# Patient Record
Sex: FEMALE | Race: BLACK OR AFRICAN AMERICAN | Employment: FULL TIME
[De-identification: names, ages, dates, MRNs, and addresses within clinical notes are randomized per-mention and may not be internally consistent; named-entity substitution may affect disease eponyms.]

---

## 2017-09-24 ENCOUNTER — HEALTH MAINTENANCE LETTER (OUTPATIENT)
Age: 47
End: 2017-09-24

## 2017-11-17 ENCOUNTER — THERAPY VISIT (OUTPATIENT)
Dept: PHYSICAL THERAPY | Facility: CLINIC | Age: 47
End: 2017-11-17
Payer: COMMERCIAL

## 2017-11-17 DIAGNOSIS — M25.512 SHOULDER PAIN, LEFT: ICD-10-CM

## 2017-11-17 DIAGNOSIS — M54.2 CERVICALGIA: Primary | ICD-10-CM

## 2017-11-17 PROCEDURE — 97110 THERAPEUTIC EXERCISES: CPT | Mod: GP | Performed by: PHYSICAL THERAPIST

## 2017-11-17 PROCEDURE — 97161 PT EVAL LOW COMPLEX 20 MIN: CPT | Mod: GP | Performed by: PHYSICAL THERAPIST
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
## 2017-11-17 NOTE — MR AVS SNAPSHOT
"              After Visit Summary   11/17/2017    Nesha Rojas    MRN: [id number]           Patient Information     Date Of Birth          1970        Visit Information        Provider Department      11/17/2017 3:20 PM Shira Larson, PT [facility] Physical Therapy        Today's Diagnoses     Cervicalgia    -  1    Shoulder pain, left           Follow-ups after your visit        Your next 10 appointments already scheduled     Nov 21, 2017  3:20 PM CST   [facility] Spine with Shira Larson PT   [facility] Physical Therapy ([facility])    [address]  [facility] [id number] [phone]            Nov 27, 2017  4:00 PM CST   CIARAN Spine with Shira Larson PT   [facility] Physical Therapy ([facility])    [address]  [facility] [address] [phone]              Who to contact     If you have questions or need follow up information about today's clinic visit or your schedule please contact [facility] PHYSICAL THERAPY directly at [phone].  Normal or non-critical lab and imaging results will be communicated to you by [employer], letter or phone within 4 business days after the clinic has received the results. If you do not hear from us within 7 days, please contact the clinic through [employer] or phone. If you have a critical or abnormal lab result, we will notify you by phone as soon as possible.  Submit refill requests through [employer] or call your pharmacy and they will forward the refill request to us. Please allow 3 business days for your refill to be completed.          Additional Information About Your Visit        MyChart Information     [employer] lets you send messages to your doctor, view your test results, renew your prescriptions, schedule appointments and more. To sign up, go to [email or web address] . Click on \"Log in\" on the " "left side of the screen, which will take you to the Welcome page. Then click on \"Sign up Now\" on the right side of the page.     You will be asked to enter the access code listed below, as well as some personal information. Please follow the directions to create your username and password.     Your access code is: [email or web address]  Expires: 2/15/2018  4:43 PM     Your access code will  in 90 days. If you need help or a new code, please call your [facility] clinic or [phone].        Care EveryWhere ID     This is your Care EveryWhere ID. This could be used by other organizations to access your [facility] medical records  [id number]         Blood Pressure from Last 3 Encounters:   No data found for BP    Weight from Last 3 Encounters:   No data found for Wt              We Performed the Following     CIARAN Inital Eval Report     PT Eval, Low Complexity ([id number])     Therapeutic Exercises        Primary Care Provider Office Phone # Fax #    Carmichael Boris Elias [phone]       [address] [facility] [address]        Equal Access to Services     [facility]: Hadii aad ku hadasho Soedilia, waaxda luqadaha, qaybta kaalmada adeisela, melva quach . So [facility] [phone].    ATENCIÓN: Si habla español, tiene a goff disposición servicios gratuitos de asistencia lingüística. [facility] [phone].    We comply with applicable federal civil rights laws and [place] laws. We do not discriminate on the basis of race, color, national origin, age, disability, sex, sexual orientation, or gender identity.            Thank you!     Thank you for choosing [facility] PHYSICAL THERAPY  for your care. Our goal is always to provide you with excellent care. Hearing back from our patients is one way we can continue to improve our services. Please take a few minutes to complete the written survey that you may receive in the mail after your visit with us. " Thank you!             Your Updated Medication List - Protect others around you: Learn how to safely use, store and throw away your medicines at www.disposemymeds.org.      Notice  As of 11/17/2017  4:43 PM    You have not been prescribed any medications.

## 2017-12-04 ENCOUNTER — THERAPY VISIT (OUTPATIENT)
Dept: PHYSICAL THERAPY | Facility: CLINIC | Age: 47
End: 2017-12-04
Payer: COMMERCIAL

## 2017-12-04 DIAGNOSIS — M25.512 SHOULDER PAIN, LEFT: ICD-10-CM

## 2017-12-04 DIAGNOSIS — M54.2 CERVICALGIA: ICD-10-CM

## 2017-12-04 PROCEDURE — 97112 NEUROMUSCULAR REEDUCATION: CPT | Mod: GP | Performed by: PHYSICAL THERAPIST

## 2017-12-04 PROCEDURE — 97140 MANUAL THERAPY 1/> REGIONS: CPT | Mod: GP | Performed by: PHYSICAL THERAPIST

## 2017-12-04 PROCEDURE — 97110 THERAPEUTIC EXERCISES: CPT | Mod: GP | Performed by: PHYSICAL THERAPIST

## 2017-12-04 NOTE — MR AVS SNAPSHOT
"              After Visit Summary   12/4/2017    Nesha Rojas    MRN: [id number]           Patient Information     Date Of Birth          1970        Visit Information        Provider Department      12/4/2017 5:20 PM Shira Larson PT [facility]        Today's Diagnoses     Cervicalgia        Shoulder pain, left           Follow-ups after your visit        Your next 10 appointments already scheduled     Dec 18, 2017  4:00 PM CST   CIARAN Spine with Shira Larson PT   [facility] ([facility])    [address]  [facility] [id number] [phone]              Who to contact     If you have questions or need follow up information about today's clinic visit or your schedule please contact [facility] PHYSICAL [facility] directly at [phone].  Normal or non-critical lab and imaging results will be communicated to you by MyChart, letter or phone within 4 business days after the clinic has received the results. If you do not hear from us within 7 days, please contact the clinic through [employer] or phone. If you have a critical or abnormal lab result, we will notify you by phone as soon as possible.  Submit refill requests through [employer] or call your pharmacy and they will forward the refill request to us. Please allow 3 business days for your refill to be completed.          Additional Information About Your Visit        MyChart Information     [employer] lets you send messages to your doctor, view your test results, renew your prescriptions, schedule appointments and more. To sign up, go to [email or web address] . Click on \"Log in\" on the left side of the screen, which will take you to the Welcome page. Then click on \"Sign up Now\" on the right side of the page.     You will be asked to enter the access code listed below, as well as some personal information. Please " follow the directions to create your username and password.     Your access code is: [email or web address]  Expires: 2/15/2018  4:43 PM     Your access code will  in 90 days. If you need help or a new code, please call your [place] clinic or [phone].        Care EveryWhere ID     This is your Care EveryWhere ID. This could be used by other organizations to access your [place] medical records  [id number]         Blood Pressure from Last 3 Encounters:   No data found for BP    Weight from Last 3 Encounters:   No data found for Wt              We Performed the Following     Manual Ther Tech, 1+Regions, EA 15 min     Neuromuscular Re-Education     Therapeutic Exercises        Primary Care Provider Office Phone # Fax #    Carmichael Boris Elias [phone]       [address]  [facility] [address]        Equal Access to Services     [facility]: Hadii aad ku hadasho Soomaali, waaxda luqadaha, qaybta kaalmada adeegyada, waxay idiin hayaan tony kharalang quach . So [facility] [phone].    ATENCIÓN: Si habla español, tiene a goff disposición servicios gratuitos de asistencia lingüística. Llame al [phone].    We comply with applicable federal civil rights laws and [place] laws. We do not discriminate on the basis of race, color, national origin, age, disability, sex, sexual orientation, or gender identity.            Thank you!     Thank you for choosing [facility]  for your care. Our goal is always to provide you with excellent care. Hearing back from our patients is one way we can continue to improve our services. Please take a few minutes to complete the written survey that you may receive in the mail after your visit with us. Thank you!             Your Updated Medication List - Protect others around you: Learn how to safely use, store and throw away your medicines at www.disposemymeds.org.      Notice  As of 2017  5:59 PM    You have not  been prescribed any medications.

## 2017-12-04 NOTE — PROGRESS NOTES
Subjective:    HPI                    Objective:    System    Physical Exam    General     ROS    Assessment/Plan:      SUBJECTIVE  Subjective changes as noted by pt:  Less pain in the L shoulder /arm and the HEP is going well.     Current pain level: 2/10     Changes in function:  Yes (See Goal flowsheet attached for changes in current functional level)     Adverse reaction to treatment or activity:  None    OBJECTIVE  Changes in objective findings:  Yes, neutral sitting posture with verbal cues, mild end range pain shld FLX/ABD- added corner stretch, tightness L UT- added stretch, and instructed in scapular stabilization with rowing- TB. Point tenderness/ mm tension L UT/levator.        ASSESSMENT  Nesha continues to require intervention to meet STG and LTG's: PT  Patient is progressing as expected.  Patient is becoming more independent in home exercise program  Response to therapy has shown an improvement in  pain level, radicular symptoms, ROM , posture and function  Progress made towards STG/LTG?  Yes (See Goal flowsheet attached for updates on achievement of STG and LTG)    PLAN  Current treatment program is being advanced to more complex exercises.  The following procedures have been added:  stretching, neuromuscular re-education and manual therapy    PTA/ATC plan:  N/A    Please refer to the daily flowsheet for treatment today, total treatment time and time spent performing 1:1 timed codes.

## 2017-12-18 ENCOUNTER — THERAPY VISIT (OUTPATIENT)
Dept: PHYSICAL THERAPY | Facility: CLINIC | Age: 47
End: 2017-12-18
Payer: COMMERCIAL

## 2017-12-18 DIAGNOSIS — M54.2 CERVICALGIA: ICD-10-CM

## 2017-12-18 DIAGNOSIS — M25.512 SHOULDER PAIN, LEFT: ICD-10-CM

## 2017-12-18 PROCEDURE — 97112 NEUROMUSCULAR REEDUCATION: CPT | Mod: GP | Performed by: PHYSICAL THERAPIST

## 2017-12-18 PROCEDURE — 97140 MANUAL THERAPY 1/> REGIONS: CPT | Mod: GP | Performed by: PHYSICAL THERAPIST

## 2017-12-18 PROCEDURE — 97110 THERAPEUTIC EXERCISES: CPT | Mod: GP | Performed by: PHYSICAL THERAPIST

## 2017-12-18 NOTE — MR AVS SNAPSHOT
"              After Visit Summary   12/18/2017    Nesha Rojas    MRN: [id number]           Patient Information     Date Of Birth          1970        Visit Information        Provider Department      12/18/2017 4:00 PM Shira Larson PT [facility]        Today's Diagnoses     Cervicalgia        Shoulder pain, left           Follow-ups after your visit        Your next 10 appointments already scheduled     Jan 05, 2018  3:20 PM CST   CIARAN Spine with Shira Larson PT   [facility] ([facility])    [address]  [facility] [id number] [phone]              Who to contact     If you have questions or need follow up information about today's clinic visit or your schedule please contact [facility] PHYSICAL [facility] directly at [phone].  Normal or non-critical lab and imaging results will be communicated to you by MyChart, letter or phone within 4 business days after the clinic has received the results. If you do not hear from us within 7 days, please contact the clinic through [employer] or phone. If you have a critical or abnormal lab result, we will notify you by phone as soon as possible.  Submit refill requests through [employer] or call your pharmacy and they will forward the refill request to us. Please allow 3 business days for your refill to be completed.          Additional Information About Your Visit        MyChart Information     [employer] lets you send messages to your doctor, view your test results, renew your prescriptions, schedule appointments and more. To sign up, go to [email or web address] . Click on \"Log in\" on the left side of the screen, which will take you to the Welcome page. Then click on \"Sign up Now\" on the right side of the page.     You will be asked to enter the access code listed below, as well as some personal information. " Please follow the directions to create your username and password.     Your access code is: [email or web address]  Expires: 2/15/2018  4:43 PM     Your access code will  in 90 days. If you need help or a new code, please call your [place] clinic or [phone].        Care EveryWhere ID     This is your Care EveryWhere ID. This could be used by other organizations to access your [place] medical records  [id number]         Blood Pressure from Last 3 Encounters:   No data found for BP    Weight from Last 3 Encounters:   No data found for Wt              We Performed the Following     Manual Ther Tech, 1+Regions, EA 15 min     Neuromuscular Re-Education     Therapeutic Exercises        Primary Care Provider Office Phone # Fax #    Carmichael Boris Elias [phone]       [address]  [facility] [address]        Equal Access to Services     [facility]: Hadii aad ku hadasho Soomaali, waaxda luqadaha, qaybta kaalmada adeegyada, waxay idiin hayaan tony kharalang quach . So [facility] [phone].    ATENCIÓN: Si habla español, tiene a goff disposición servicios gratuitos de asistencia lingüística. Llame al [phone].    We comply with applicable federal civil rights laws and [place] laws. We do not discriminate on the basis of race, color, national origin, age, disability, sex, sexual orientation, or gender identity.            Thank you!     Thank you for choosing [facility] THER[facility]  for your care. Our goal is always to provide you with excellent care. Hearing back from our patients is one way we can continue to improve our services. Please take a few minutes to complete the written survey that you may receive in the mail after your visit with us. Thank you!             Your Updated Medication List - Protect others around you: Learn how to safely use, store and throw away your medicines at www.disposemymeds.org.      Notice  As of 2017  4:42 PM    You  have not been prescribed any medications.

## 2017-12-18 NOTE — PROGRESS NOTES
[facility] Evaluation  Subjective:    HPI                    Objective:    System    Physical Exam    General     ROS    Assessment/Plan:      SUBJECTIVE  Subjective changes as noted by pt:  My shoulder is feeling better and HEP is going well.     Current pain level: 1/10     Changes in function:  Yes (See Goal flowsheet attached for changes in current functional level)     Adverse reaction to treatment or activity:  None    OBJECTIVE  Changes in objective findings:  Yes, CROM is WFL rotation B, instructed in scaption and pulldown- tolerated well.  Muscle tension L UT/levator- reviewed neutral posture sitting at work- cervical retraction 3x daily.        ASSESSMENT  Nesha continues to require intervention to meet STG and LTG's: PT  Patient is progressing as expected.  Patient is becoming more independent in home exercise program  Response to therapy has shown an improvement in  pain level, ROM , muscle control, posture and function  Progress made towards STG/LTG?  Yes (See Goal flowsheet attached for updates on achievement of STG and LTG)    PLAN  Current treatment program is being advanced to more complex exercises.  The following procedures have been added:  strengthening and neuromuscular re-education    PTA/ATC plan:  N/A    Please refer to the daily flowsheet for treatment today, total treatment time and time spent performing 1:1 timed codes.

## 2018-01-05 ENCOUNTER — THERAPY VISIT (OUTPATIENT)
Dept: PHYSICAL THERAPY | Facility: CLINIC | Age: 48
End: 2018-01-05
Payer: COMMERCIAL

## 2018-01-05 DIAGNOSIS — M25.512 SHOULDER PAIN, LEFT: ICD-10-CM

## 2018-01-05 DIAGNOSIS — M54.2 CERVICALGIA: ICD-10-CM

## 2018-01-05 PROCEDURE — 97112 NEUROMUSCULAR REEDUCATION: CPT | Mod: GP | Performed by: PHYSICAL THERAPIST

## 2018-01-05 PROCEDURE — 97140 MANUAL THERAPY 1/> REGIONS: CPT | Mod: GP | Performed by: PHYSICAL THERAPIST

## 2018-01-05 PROCEDURE — 97110 THERAPEUTIC EXERCISES: CPT | Mod: GP | Performed by: PHYSICAL THERAPIST

## 2018-01-05 NOTE — PROGRESS NOTES
Subjective:  HPI                    Objective:  System    Physical Exam    General     ROS    Assessment/Plan:    SUBJECTIVE  Subjective changes as noted by pt:  I have not had any pain in the neck or shoulder in past week. HEP is going good.  I have been on vacation though.   Current pain level: 0/10     Changes in function:  Yes (See Goal flowsheet attached for changes in current functional level)     Adverse reaction to treatment or activity:  None    OBJECTIVE  Changes in objective findings:  Yes,  CROM is WNL pain free, L shld AROM is WNL pain free,neutral sitting posture without cues, minimal to no UT/levator mm tension or point tenderness.     ASSESSMENT  Nesha continues to require intervention to meet STG and LTG's: PT  Patient's symptoms are resolving.  Patient is becoming more independent in home exercise program  Response to therapy has shown an improvement in  pain level, ROM , muscle control, posture and function  Progress made towards STG/LTG?  Yes (See Goal flowsheet attached for updates on achievement of STG and LTG)    PLAN  Current treatment program is being advanced to more complex exercises.  The following procedures have been added:  strengthening  Patient will continue HEP and FU in PT if needed in next month.  PTA/ATC plan:  N/A    Please refer to the daily flowsheet for treatment today, total treatment time and time spent performing 1:1 timed codes.      DISCHARGE REPORT    Progress reporting period is from 11/17/17  to 1/5/18.     SUBJECTIVE                   ;   ,     The subjective and objective information are from the last SOAP note on this patient.    OBJECTIVE         ASSESSMENT/PLAN  Updated problem list and treatment plan: Diagnosis 1:  Shoulder and neck pain    STG/LTGs have been met or progress has been made towards goals:  Yes (See Goal flow sheet completed today.)  Assessment of Progress: The patient has met all of their long term goals.  Patient is meeting short term goals and  is progressing towards long term goals.  Self Management Plans:  Patient is independent in a home treatment program.  Patient is independent in self management of symptoms.    Nesha continues to require the following intervention to meet STG and LTG's: PT  We will discharge this patient from PT.    Recommendations:  This patient is ready to be discharged from therapy and continue their home treatment program.    Please refer to the daily flowsheet for treatment today, total treatment time and time spent performing 1:1 timed codes.

## 2018-01-05 NOTE — MR AVS SNAPSHOT
"              After Visit Summary   2018    Nesha Rojas    MRN: [id number]           Patient Information     Date Of Birth          1970        Visit Information        Provider Department      2018 3:20 PM Shira Larson PT [facility]        Today's Diagnoses     Cervicalgia        Shoulder pain, left           Follow-ups after your visit        Who to contact     If you have questions or need follow up information about today's clinic visit or your schedule please contact [facility] PHYSICAL [facility] directly at [phone].  Normal or non-critical lab and imaging results will be communicated to you by [email or web address], letter or phone within 4 business days after the clinic has received the results. If you do not hear from us within 7 days, please contact the clinic through [email or web address] or phone. If you have a critical or abnormal lab result, we will notify you by phone as soon as possible.  Submit refill requests through [employer] or call your pharmacy and they will forward the refill request to us. Please allow 3 business days for your refill to be completed.          Additional Information About Your Visit        MyChart Information     [employer] lets you send messages to your doctor, view your test results, renew your prescriptions, schedule appointments and more. To sign up, go to [email or web address] . Click on \"Log in\" on the left side of the screen, which will take you to the Welcome page. Then click on \"Sign up Now\" on the right side of the page.     You will be asked to enter the access code listed below, as well as some personal information. Please follow the directions to create your username and password.     Your access code is: [email or web address]  Expires: 2/15/2018  4:43 PM     Your access code will  in 90 days. If you need help or a new code, please call your [place] clinic or [phone].        Care EveryWhere " ID     This is your Care EveryWhere ID. This could be used by other organizations to access your [place] medical records  [id number]         Blood Pressure from Last 3 Encounters:   No data found for BP    Weight from Last 3 Encounters:   No data found for Wt              We Performed the Following     Manual Ther Tech, 1+Regions, EA 15 min     Neuromuscular Re-Education     Therapeutic Exercises        Primary Care Provider Office Phone # Fax #    Jany Boris Elias [phone] [address] [place] [address]        Equal Access to Services     [facility]: Hadii aad ku hadasho Soomaali, waaxda luqadaha, qaybta kaalmada adeegyada, waxay idiin hayaan adeeg kharalang lashahram . So [facility] [phone].    ATENCIÓN: Si habla español, tiene a goff disposición servicios gratuitos de asistencia lingüística. Ll[facility] [phone].    We comply with applicable federal civil rights laws and [place] laws. We do not discriminate on the basis of race, color, national origin, age, disability, sex, sexual orientation, or gender identity.            Thank you!     Thank you for choosing [facility] THERAP[facility]  for your care. Our goal is always to provide you with excellent care. Hearing back from our patients is one way we can continue to improve our services. Please take a few minutes to complete the written survey that you may receive in the mail after your visit with us. Thank you!             Your Updated Medication List - Protect others around you: Learn how to safely use, store and throw away your medicines at www.disposemymeds.org.      Notice  As of 1/5/2018  3:58 PM    You have not been prescribed any medications.

## 2018-01-24 PROBLEM — M25.512 SHOULDER PAIN, LEFT: Status: RESOLVED | Noted: 2017-11-17 | Resolved: 2018-01-24

## 2018-01-24 PROBLEM — M54.2 CERVICALGIA: Status: RESOLVED | Noted: 2017-11-17 | Resolved: 2018-01-24

## 2020-12-11 ENCOUNTER — THERAPY VISIT (OUTPATIENT)
Dept: PHYSICAL THERAPY | Facility: CLINIC | Age: 50
End: 2020-12-11
Payer: COMMERCIAL

## 2020-12-11 DIAGNOSIS — M25.512 ACUTE PAIN OF LEFT SHOULDER: ICD-10-CM

## 2020-12-11 PROCEDURE — 97161 PT EVAL LOW COMPLEX 20 MIN: CPT | Mod: GP | Performed by: PHYSICAL THERAPIST

## 2020-12-11 PROCEDURE — 97110 THERAPEUTIC EXERCISES: CPT | Mod: GP | Performed by: PHYSICAL THERAPIST

## 2020-12-11 NOTE — PROGRESS NOTES
[facility] Initial Evaluation  Subjective:  The history is provided by the patient. No  was used.   Therapist Generated HPI Evaluation  Problem details: 18 she fainted 2 x do to dehydration because she was prepping for a colonoscopy. She ended up having to get an IV and everything but since then her left shoulder she has been having a numbing pain in her upper arm. She can use it but the more she does the worse it gets. Pain does feel its in the joint. .         Type of problem:  Left shoulder.    This is a new (20) condition.  Condition occurred with:  A fall.  Where condition occurred: for unknown reasons.  Patient reports pain:  Lateral and upper arm.  Pain is described as aching and sharp and is constant.  Pain is the same all the time.  Since onset symptoms are unchanged.  Associated symptoms:  Tingling. Symptoms are exacerbated by lifting (sleeping on her left side, carrying a grocery bag. )  and relieved by nothing.      Restrictions due to condition include:  Working in normal job without restrictions.  Barriers include:  None as reported by patient.    Patient Health History  Nesha Rojas being seen for left shoulder pain .     Problem began: 2020.   Problem occurred:  a fall   Pain is reported as 6/10 on pain scale.  General health as reported by patient is good.  Pertinent medical history includes: anemia, high blood pressure, migraines/headaches and overweight.   Red flags:  None as reported by patient.  Medical allergies: none.   Surgeries include:  Other. Other surgery history details:  / hysterectomy.    Current medications:  High blood pressure medication.    Current occupation is [employer].   Primary job tasks include:  Computer work.                                    Objective:  System                   Shoulder Evaluation:  ROM:  AROM:    Flexion:  Left:  154 but with pain at 120 deg and up    Right:  154  Extension: Left:  80Right: 80  Abduction:  Left: 150   Right:  158      External Rotation:  Left:  82    Right:  82            Extension/Internal Rotation:  Left:  T7    Right:  T6    PROM:    Flexion:  Left:  150    Right: 160      Abduction:  Left:  150    Right:  160      External Rotation:  Left:  82    Right:  82                    Strength:    Flexion: Left:5/5 Strong/painful    Pain:    Right: 5/5  Strong/pain free     Pain:   Extension:  Left: 5/5  Strong/pain free    Pain:    Right: 5/5    Strong/pain free  Pain:  Abduction:  Left: 4/5  Weak/painful  Pain:    Right: 5/5   Strong/pain free    Pain:  Adduction:  Left: 5/5   Strong/pain free   Pain:    Right: 5/5   Strong/pain free    Pain:  Internal Rotation:  Left:5/5   Strong/pain free    Pain:    Right: 5/5   Strong/pain free    Pain:  External Rotation:   Left:5/5   Strong/pain free    Pain:   Right:/5  Strong/pain free    Pain:              Special Tests:    Left shoulder positive for the following special tests:  Impingement  Left shoulder negative for the following special tests:  Labral; Bursal and Rotator cuff tear    Palpation:    Left shoulder tenderness present at:  Acrimioclavicular; Levator and Rhomboids  Left shoulder tenderness not present at: Supraspinatus; Infraspinatus; Teres Minor; Subscapularis or Bicipital Groove                                       [facility] Cervical Evaluation      Movement Loss:  Protrusion (PRO): nil  Flexion (Flex): nil  Retraction (RET): nil  Extension (EXT): nil  Lateral Flexion Right (LF R): min and pain  Lateral Flexion Left (LF L): nil  Rotation Right (ROT R): nil  Rotation Left (ROT L): min and pain                                                 ROS    Assessment/Plan:    Patient is a 50 year old female with left side shoulder complaints.    Patient has the following significant findings with corresponding treatment plan.                Diagnosis 1:  Acute left shoulder pain  Pain -  hot/cold therapy, US, manual therapy, self  management, education, directional preference exercise and home program  Decreased ROM/flexibility - manual therapy, therapeutic exercise, therapeutic activity and home program  Decreased joint mobility - manual therapy, therapeutic exercise, therapeutic activity and home program  Decreased strength - therapeutic exercise, therapeutic activities and home program  Decreased function - therapeutic activities and home program    Therapy Evaluation Codes:   1) History comprised of:   Personal factors that impact the plan of care:      None.    Comorbidity factors that impact the plan of care are:      None.     Medications impacting care: None.  2) Examination of Body Systems comprised of:   Body structures and functions that impact the plan of care:      Shoulder.   Activity limitations that impact the plan of care are:      Bathing, Driving, Dressing and Lifting.  3) Clinical presentation characteristics are:   Stable/Uncomplicated.  4) Decision-Making    Low complexity using standardized patient assessment instrument and/or measureable assessment of functional outcome.  Cumulative Therapy Evaluation is: Low complexity.    Previous and current functional limitations:  (See Goal Flow Sheet for this information)    Short term and Long term goals: (See Goal Flow Sheet for this information)     Communication ability:  Patient appears to be able to clearly communicate and understand verbal and written communication and follow directions correctly.  Treatment Explanation - The following has been discussed with the patient:   RX ordered/plan of care  Anticipated outcomes  Possible risks and side effects  This patient would benefit from PT intervention to resume normal activities.   Rehab potential is good.    Frequency:  1 X week, once daily  Duration:  for 8 weeks  Discharge Plan:  Achieve all LTG.  Independent in home treatment program.  Reach maximal therapeutic benefit.    Please refer to the daily flowsheet for  treatment today, total treatment time and time spent performing 1:1 timed codes.

## 2020-12-11 NOTE — LETTER
[facility] PHYSICAL THERAPY  [address]  [facility] [address]  [phone]    2020    Re: Nesha Rojas   :   1970  MRN:  [id number]   REFERRING PHYSICIAN:   Brent Arreola    [facility] PHYSICAL THERAPY    Date of Initial Evaluation:  20  Visits:  Rxs Used: 1  Reason for Referral:  Acute pain of left shoulder    EVALUATION SUMMARY    [facility] Initial Evaluation    Subjective:  The history is provided by the patient. No  was used.     Therapist Generated HPI Evaluation  Problem details: 20 she fainted 2 x do to dehydration because she was prepping for a colonoscopy. She ended up having to get an IV and everything but since then her left shoulder she has been having a numbing pain in her upper arm. She can use it but the more she does the worse it gets. Pain does feel its in the joint. .         Type of problem:  Left shoulder.    This is a new (20) condition.  Condition occurred with:  A fall.  Where condition occurred: for unknown reasons.  Patient reports pain:  Lateral and upper arm.  Pain is described as aching and sharp and is constant.  Pain is the same all the time.  Since onset symptoms are unchanged.  Associated symptoms:  Tingling. Symptoms are exacerbated by lifting (sleeping on her left side, carrying a grocery bag. )  and relieved by nothing.    Restrictions due to condition include:  Working in normal job without restrictions.  Barriers include:  None as reported by patient.    Patient Health History  Nesha Rojas being seen for left shoulder pain .     Problem began: 2020.   Problem occurred:  a fall   Pain is reported as 6/10 on pain scale.  General health as reported by patient is good.  Pertinent medical history includes: anemia, high blood pressure, migraines/headaches and overweight.   Red flags:  None as reported by patient.  Medical allergies: none.    Surgeries include:  Other. Other surgery history details:  / hysterectomy.    Current medications:  High blood pressure medication.    Current occupation is [employer].   Primary job tasks include:  Computer work.                  Objective:  Shoulder Evaluation:  ROM:  AROM:    Flexion:  Left:  154 but with pain at 120 deg and up    Right:  154  Extension: Left: 80Right: 80    Abduction:  Left: 150   Right:  158    External Rotation:  Left:  82    Right:  82  Extension/Internal Rotation:  Left:  T7    Right:  T6      PROM:    Flexion:  Left:  150    Right: 160      Abduction:  Left:  150    Right:  160    External Rotation:  Left:  82    Right:  82    Strength:    Flexion: Left:5/5 Strong/painful    Pain:    Right: 5/5  Strong/pain free     Pain:   Extension:  Left: 5/5  Strong/pain free    Pain:    Right: 5/5    Strong/pain free  Pain:    Abduction:  Left: 4/5  Weak/painful  Pain:    Right: 5/5   Strong/pain free    Pain:  Adduction:  Left: 5/5   Strong/pain free   Pain:    Right: 5/5   Strong/pain free    Pain:    Internal Rotation:  Left:5/5   Strong/pain free    Pain:    Right: 5/5   Strong/pain free    Pain:  External Rotation:   Left:5/5   Strong/pain free    Pain:   Right:/5  Strong/pain free    Pain:    Special Tests:    Left shoulder positive for the following special tests:  Impingement  Left shoulder negative for the following special tests:  Labral; Bursal and Rotator cuff tear    Palpation:    Left shoulder tenderness present at:  Acrimioclavicular; Levator and Rhomboids  Left shoulder tenderness not present at: Supraspinatus; Infraspinatus; Teres Minor; Subscapularis or Bicipital Groove    Stuart Cervical Evaluation  Movement Loss:  Protrusion (PRO): nil  Flexion (Flex): nil  Retraction (RET): nil  Extension (EXT): nil  Lateral Flexion Right (LF R): min and pain  Lateral Flexion Left (LF L): nil  Rotation Right (ROT R): nil  Rotation Left (ROT L): min and pain        Assessment/Plan:     Patient is a 50 year old female with left side shoulder complaints.    Patient has the following significant findings with corresponding treatment plan.                Diagnosis 1:  Acute left shoulder pain  Pain -  hot/cold therapy, US, manual therapy, self management, education, directional preference exercise and home program  Decreased ROM/flexibility - manual therapy, therapeutic exercise, therapeutic activity and home program  Decreased joint mobility - manual therapy, therapeutic exercise, therapeutic activity and home program  Decreased strength - therapeutic exercise, therapeutic activities and home program  Decreased function - therapeutic activities and home program    Therapy Evaluation Codes:   1) History comprised of:   Personal factors that impact the plan of care:      None.    Comorbidity factors that impact the plan of care are:      None.     Medications impacting care: None.  2) Examination of Body Systems comprised of:   Body structures and functions that impact the plan of care:      Shoulder.   Activity limitations that impact the plan of care are:      Bathing, Driving, Dressing and Lifting.  3) Clinical presentation characteristics are:   Stable/Uncomplicated.  4) Decision-Making    Low complexity using standardized patient assessment instrument and/or measureable assessment of functional outcome.    Cumulative Therapy Evaluation is: Low complexity.    Previous and current functional limitations:  (See Goal Flow Sheet for this information)    Short term and Long term goals: (See Goal Flow Sheet for this information)     Communication ability:  Patient appears to be able to clearly communicate and understand verbal and written communication and follow directions correctly.  Treatment Explanation - The following has been discussed with the patient:   RX ordered/plan of care  Anticipated outcomes  Possible risks and side effects  This patient would benefit from PT intervention to resume  normal activities.   Rehab potential is good.    Frequency:  1 X week, once daily  Duration:  for 8 weeks  Discharge Plan:  Achieve all LTG.  Independent in home treatment program.  Reach maximal therapeutic benefit.    Thank you for your referral.              INQUIRIES  Therapist: Bin Simpson PT   [facility] PHYSICAL THERAPY  [address]  [facility] [address]  Phone: [phone]  Fax: [phone]

## 2021-02-17 PROBLEM — M25.512 ACUTE PAIN OF LEFT SHOULDER: Status: RESOLVED | Noted: 2020-12-11 | Resolved: 2021-02-17

## 2022-03-28 ENCOUNTER — PATIENT MESSAGE (OUTPATIENT)
Dept: ADMINISTRATIVE | Facility: OTHER | Age: 52
End: 2022-03-28
Payer: COMMERCIAL

## 2022-05-02 ENCOUNTER — OFFICE VISIT (OUTPATIENT)
Dept: FAMILY MEDICINE | Facility: CLINIC | Age: 52
End: 2022-05-02
Payer: COMMERCIAL

## 2022-05-02 VITALS
TEMPERATURE: 98 F | SYSTOLIC BLOOD PRESSURE: 132 MMHG | BODY MASS INDEX: 39.96 KG/M2 | DIASTOLIC BLOOD PRESSURE: 88 MMHG | OXYGEN SATURATION: 98 % | HEART RATE: 58 BPM | HEIGHT: 62 IN | WEIGHT: 217.13 LBS

## 2022-05-02 DIAGNOSIS — D64.9 ANEMIA, UNSPECIFIED TYPE: ICD-10-CM

## 2022-05-02 DIAGNOSIS — I42.2 HYPERTROPHIC CARDIOMYOPATHY: Primary | ICD-10-CM

## 2022-05-02 DIAGNOSIS — R73.9 HYPERGLYCEMIA: ICD-10-CM

## 2022-05-02 DIAGNOSIS — Z12.31 SCREENING MAMMOGRAM FOR HIGH-RISK PATIENT: ICD-10-CM

## 2022-05-02 DIAGNOSIS — Z00.00 WELLNESS EXAMINATION: ICD-10-CM

## 2022-05-02 DIAGNOSIS — Z12.11 COLON CANCER SCREENING: ICD-10-CM

## 2022-05-02 DIAGNOSIS — Z13.6 SCREENING FOR CARDIOVASCULAR CONDITION: ICD-10-CM

## 2022-05-02 PROCEDURE — 3008F PR BODY MASS INDEX (BMI) DOCUMENTED: ICD-10-PCS | Mod: CPTII,S$GLB,, | Performed by: STUDENT IN AN ORGANIZED HEALTH CARE EDUCATION/TRAINING PROGRAM

## 2022-05-02 PROCEDURE — 1160F PR REVIEW ALL MEDS BY PRESCRIBER/CLIN PHARMACIST DOCUMENTED: ICD-10-PCS | Mod: CPTII,S$GLB,, | Performed by: STUDENT IN AN ORGANIZED HEALTH CARE EDUCATION/TRAINING PROGRAM

## 2022-05-02 PROCEDURE — 3079F PR MOST RECENT DIASTOLIC BLOOD PRESSURE 80-89 MM HG: ICD-10-PCS | Mod: CPTII,S$GLB,, | Performed by: STUDENT IN AN ORGANIZED HEALTH CARE EDUCATION/TRAINING PROGRAM

## 2022-05-02 PROCEDURE — 99386 PREV VISIT NEW AGE 40-64: CPT | Mod: S$GLB,,, | Performed by: STUDENT IN AN ORGANIZED HEALTH CARE EDUCATION/TRAINING PROGRAM

## 2022-05-02 PROCEDURE — 1160F RVW MEDS BY RX/DR IN RCRD: CPT | Mod: CPTII,S$GLB,, | Performed by: STUDENT IN AN ORGANIZED HEALTH CARE EDUCATION/TRAINING PROGRAM

## 2022-05-02 PROCEDURE — 3075F SYST BP GE 130 - 139MM HG: CPT | Mod: CPTII,S$GLB,, | Performed by: STUDENT IN AN ORGANIZED HEALTH CARE EDUCATION/TRAINING PROGRAM

## 2022-05-02 PROCEDURE — 99386 PR PREVENTIVE VISIT,NEW,40-64: ICD-10-PCS | Mod: S$GLB,,, | Performed by: STUDENT IN AN ORGANIZED HEALTH CARE EDUCATION/TRAINING PROGRAM

## 2022-05-02 PROCEDURE — 3075F PR MOST RECENT SYSTOLIC BLOOD PRESS GE 130-139MM HG: ICD-10-PCS | Mod: CPTII,S$GLB,, | Performed by: STUDENT IN AN ORGANIZED HEALTH CARE EDUCATION/TRAINING PROGRAM

## 2022-05-02 PROCEDURE — 3079F DIAST BP 80-89 MM HG: CPT | Mod: CPTII,S$GLB,, | Performed by: STUDENT IN AN ORGANIZED HEALTH CARE EDUCATION/TRAINING PROGRAM

## 2022-05-02 PROCEDURE — 3008F BODY MASS INDEX DOCD: CPT | Mod: CPTII,S$GLB,, | Performed by: STUDENT IN AN ORGANIZED HEALTH CARE EDUCATION/TRAINING PROGRAM

## 2022-05-02 PROCEDURE — 1159F PR MEDICATION LIST DOCUMENTED IN MEDICAL RECORD: ICD-10-PCS | Mod: CPTII,S$GLB,, | Performed by: STUDENT IN AN ORGANIZED HEALTH CARE EDUCATION/TRAINING PROGRAM

## 2022-05-02 PROCEDURE — 1159F MED LIST DOCD IN RCRD: CPT | Mod: CPTII,S$GLB,, | Performed by: STUDENT IN AN ORGANIZED HEALTH CARE EDUCATION/TRAINING PROGRAM

## 2022-05-02 RX ORDER — METOPROLOL SUCCINATE 50 MG/1
50 TABLET, EXTENDED RELEASE ORAL ONCE
COMMUNITY
Start: 2021-09-09 | End: 2022-11-01 | Stop reason: SDUPTHER

## 2022-05-02 RX ORDER — CYANOCOBALAMIN 1000 UG/ML
1000 INJECTION, SOLUTION INTRAMUSCULAR; SUBCUTANEOUS
COMMUNITY
End: 2024-02-07

## 2022-05-02 NOTE — PROGRESS NOTES
Patient ID: Antonieta Enriquez is a 52 y.o. female. This patient is new to me.    Chief Complaint: Establish Care    HPI  Patient here to establish care. She moved to the area approximately 4 months ago due to her job. She was previously following with a PCP in [place]. She has a history of hypertrophic cardiomyopathy. She has not established care with a local cardiololgist at this time.     She does not colon cancer screening. She has tried having a colonoscopy in the past but passed out twice due to dehydration while doing the prep.    She denies any recent chest pain or shortness of breath.     Past Medical History:   Diagnosis Date    Hypertension     Hypertrophic cardiomyopathy     Migraine        Past Surgical History:   Procedure Laterality Date     SECTION      CHOLECYSTECTOMY      GASTRIC BYPASS N/A 2009    HYSTERECTOMY         Family History   Problem Relation Age of Onset    Arthritis Mother     Diabetes Mother     Hypertension Mother     Cancer Father     Diabetes Brother     Diabetes Brother        Social History     Tobacco Use    Smoking status: Never Smoker    Smokeless tobacco: Never Used   Substance Use Topics    Alcohol use: Yes    Drug use: Never       Review of patient's allergies indicates:  No Known Allergies     Review of Systems  Review of Systems   Constitutional: Negative for fever.   HENT: Negative for ear pain, hearing loss and sinus pain.    Eyes: Negative for discharge.   Respiratory: Negative for cough, shortness of breath and wheezing.    Cardiovascular: Negative for chest pain, palpitations and leg swelling.   Gastrointestinal: Negative for blood in stool, constipation, diarrhea, nausea and vomiting.   Genitourinary: Negative for dysuria, hematuria and urgency.   Musculoskeletal: Negative for myalgias and neck pain.   Skin: Negative for rash.   Neurological: Negative for weakness and headaches.   Endo/Heme/Allergies: Negative for polydipsia.  "  Psychiatric/Behavioral: Negative for depression.   All other systems reviewed and are negative.      Currently Medications  Current Outpatient Medications on File Prior to Visit   Medication Sig Dispense Refill    cyanocobalamin 1,000 mcg/mL injection Inject 1,000 mcg into the muscle.      metoprolol succinate (TOPROL-XL) 50 MG 24 hr tablet Take 50 mg by mouth once.      [DISCONTINUED] lisinopril-hydrochlorothiazide (PRINZIDE,ZESTORETIC) 10-12.5 mg per tablet Take 1 tablet by mouth once daily.       No current facility-administered medications on file prior to visit.       Physical  Exam  Vitals:    05/02/22 1543   BP: 132/88   BP Location: Right arm   Patient Position: Sitting   Pulse: (!) 58   Temp: 98.3 °F (36.8 °C)   TempSrc: Oral   SpO2: 98%   Weight: 98.5 kg (217 lb 2.5 oz)   Height: 5' 2" (1.575 m)      Body mass index is 39.72 kg/m².    Physical Exam  Vitals and nursing note reviewed.   Constitutional:       General: She is not in acute distress.     Appearance: She is not ill-appearing.   HENT:      Head: Normocephalic and atraumatic.      Right Ear: External ear normal.      Left Ear: External ear normal.      Nose: Nose normal.      Mouth/Throat:      Mouth: Mucous membranes are moist.   Eyes:      Extraocular Movements: Extraocular movements intact.      Conjunctiva/sclera: Conjunctivae normal.   Cardiovascular:      Rate and Rhythm: Normal rate and regular rhythm.      Pulses: Normal pulses.      Heart sounds: Murmur heard.    Systolic murmur is present.  Pulmonary:      Effort: Pulmonary effort is normal. No respiratory distress.      Breath sounds: No wheezing.   Abdominal:      General: There is no distension.      Palpations: Abdomen is soft. There is no mass.      Tenderness: There is no abdominal tenderness.   Musculoskeletal:         General: No swelling.      Cervical back: Normal range of motion.   Skin:     Coloration: Skin is not jaundiced.      Findings: No rash.   Neurological:      " General: No focal deficit present.      Mental Status: She is alert and oriented to person, place, and time.   Psychiatric:         Mood and Affect: Mood normal.         Thought Content: Thought content normal.         Labs:    Complete Blood Count  Lab Results   Component Value Date    RBC 4.60 02/13/2016    HGB 11.4 (L) 02/13/2016    HCT 35.6 (L) 02/13/2016    MCV 77 (L) 02/13/2016    MCH 24.8 (L) 02/13/2016    MCHC 32.0 02/13/2016    RDW 15.2 (H) 02/13/2016     02/13/2016    MPV 10.3 02/13/2016    GRAN 2.2 02/13/2016    GRAN 39.0 02/13/2016    LYMPH 2.8 02/13/2016    LYMPH 50.1 (H) 02/13/2016    MONO 0.5 02/13/2016    MONO 9.7 02/13/2016    EOS 0.0 02/13/2016    BASO 0.03 02/13/2016    EOSINOPHIL 0.7 02/13/2016    BASOPHIL 0.5 02/13/2016    DIFFMETHOD Automated 02/13/2016       Comprehensive Metabolic Panel  No results found for: GLU, BUN, CREATININE, NA, K, CL, PROT, ALBUMIN, BILITOT, AST, ALKPHOS, CO2, ALT, ANIONGAP, EGFRNONAA, ESTGFRAFRICA    TSH  No results found for: TSH    Imaging:  X-Ray Chest PA And Lateral  Narrative: PA and Lateral Chest x-ray    Clinical Indication: Chest pain.  Essential hypertension.       Findings:    No comparison studies are available.      The lungs are clear. The cardiac silhouette size is normal. The trachea is midline and the mediastinal width is normal. Negative for focal infiltrate, effusion or pneumothorax. Pulmonary vasculature is normal. Negative for osseous abnormalities. There are degenerative changes of the spine. There is tortuosity of the descending thoracic aorta. There are cholecystectomy clips.  Impression:        1.  Negative for acute process involving the chest.  2.  Incidental findings as noted above.    Electronically signed by: SANTIAGO FARRELL MD  Date:     02/13/16  Time:    14:13   CT Head Without Contrast  Narrative: Head CT scan without contrast    Clinical Indication: Unspecified speech disturbances.    Findings:  No comparison studies are  available.   The ventricles are midline and the CSF spaces are normal. The gray-white matter junction is well preserved. Negative for intracranial vascular abnormalities. Negative for mass, mass effect, cerebral edema, hemorrhage or abnormal fluid collections.       The skull and scalp are intact.    The   paranasal sinuses, mastoid air cells, middle ears and ear canals are clear. The globes are intact.  Impression:        1.  Negative for acute intracranial process. Negative for hemorrhage, or skull fracture.    Electronically signed by: SANTIAGO FARRELL MD  Date:     02/13/16  Time:    14:06       Assessment/Plan:    Problem List Items Addressed This Visit    None     Visit Diagnoses     Hypertrophic cardiomyopathy    -  Primary    Relevant Orders    Ambulatory referral/consult to Cardiology    TSH    Screening mammogram for high-risk patient        Relevant Orders    Mammo Digital Screening Bilat w/ Vic    Colon cancer screening        Relevant Orders    Cologuard Screening (Multitarget Stool DNA)    Anemia, unspecified type        Relevant Orders    CBC Auto Differential    Comprehensive metabolic panel    Hyperglycemia        Relevant Orders    HEMOGLOBIN A1C    Screening for cardiovascular condition        Relevant Orders    LIPID PANEL            Discussed how to stay healthy including: diet, exercise, refraining from smoking and discussed screening exams / tests needed for age, sex and family Hx.    RTC pending results of blood test    Rebel Kimball MD

## 2022-05-23 ENCOUNTER — OFFICE VISIT (OUTPATIENT)
Dept: CARDIOLOGY | Facility: CLINIC | Age: 52
End: 2022-05-23
Payer: COMMERCIAL

## 2022-05-23 ENCOUNTER — PATIENT MESSAGE (OUTPATIENT)
Dept: ADMINISTRATIVE | Facility: HOSPITAL | Age: 52
End: 2022-05-23
Payer: COMMERCIAL

## 2022-05-23 VITALS
BODY MASS INDEX: 38.68 KG/M2 | WEIGHT: 211.5 LBS | HEART RATE: 47 BPM | DIASTOLIC BLOOD PRESSURE: 82 MMHG | OXYGEN SATURATION: 98 % | SYSTOLIC BLOOD PRESSURE: 146 MMHG

## 2022-05-23 DIAGNOSIS — I34.0 NONRHEUMATIC MITRAL VALVE REGURGITATION: ICD-10-CM

## 2022-05-23 DIAGNOSIS — E66.9 OBESITY (BMI 35.0-39.9 WITHOUT COMORBIDITY): ICD-10-CM

## 2022-05-23 DIAGNOSIS — I10 PRIMARY HYPERTENSION: Primary | ICD-10-CM

## 2022-05-23 DIAGNOSIS — R00.1 BRADYCARDIA: ICD-10-CM

## 2022-05-23 DIAGNOSIS — I42.1 HOCM (HYPERTROPHIC OBSTRUCTIVE CARDIOMYOPATHY): ICD-10-CM

## 2022-05-23 DIAGNOSIS — I42.2 HYPERTROPHIC CARDIOMYOPATHY: ICD-10-CM

## 2022-05-23 PROCEDURE — 3079F DIAST BP 80-89 MM HG: CPT | Mod: CPTII,S$GLB,, | Performed by: INTERNAL MEDICINE

## 2022-05-23 PROCEDURE — 3077F PR MOST RECENT SYSTOLIC BLOOD PRESSURE >= 140 MM HG: ICD-10-PCS | Mod: CPTII,S$GLB,, | Performed by: INTERNAL MEDICINE

## 2022-05-23 PROCEDURE — 3079F PR MOST RECENT DIASTOLIC BLOOD PRESSURE 80-89 MM HG: ICD-10-PCS | Mod: CPTII,S$GLB,, | Performed by: INTERNAL MEDICINE

## 2022-05-23 PROCEDURE — 99999 PR PBB SHADOW E&M-EST. PATIENT-LVL III: CPT | Mod: PBBFAC,,, | Performed by: INTERNAL MEDICINE

## 2022-05-23 PROCEDURE — 99205 PR OFFICE/OUTPT VISIT, NEW, LEVL V, 60-74 MIN: ICD-10-PCS | Mod: S$GLB,,, | Performed by: INTERNAL MEDICINE

## 2022-05-23 PROCEDURE — 4010F PR ACE/ARB THEARPY RXD/TAKEN: ICD-10-PCS | Mod: CPTII,S$GLB,, | Performed by: INTERNAL MEDICINE

## 2022-05-23 PROCEDURE — 4010F ACE/ARB THERAPY RXD/TAKEN: CPT | Mod: CPTII,S$GLB,, | Performed by: INTERNAL MEDICINE

## 2022-05-23 PROCEDURE — 3008F BODY MASS INDEX DOCD: CPT | Mod: CPTII,S$GLB,, | Performed by: INTERNAL MEDICINE

## 2022-05-23 PROCEDURE — 99999 PR PBB SHADOW E&M-EST. PATIENT-LVL III: ICD-10-PCS | Mod: PBBFAC,,, | Performed by: INTERNAL MEDICINE

## 2022-05-23 PROCEDURE — 3077F SYST BP >= 140 MM HG: CPT | Mod: CPTII,S$GLB,, | Performed by: INTERNAL MEDICINE

## 2022-05-23 PROCEDURE — 3008F PR BODY MASS INDEX (BMI) DOCUMENTED: ICD-10-PCS | Mod: CPTII,S$GLB,, | Performed by: INTERNAL MEDICINE

## 2022-05-23 PROCEDURE — 99205 OFFICE O/P NEW HI 60 MIN: CPT | Mod: S$GLB,,, | Performed by: INTERNAL MEDICINE

## 2022-05-23 RX ORDER — LISINOPRIL 5 MG/1
5 TABLET ORAL DAILY
Qty: 90 TABLET | Refills: 4 | Status: SHIPPED | OUTPATIENT
Start: 2022-05-23 | End: 2023-05-28

## 2022-05-23 NOTE — PROGRESS NOTES
[facility] Cardiology     Subjective:    Patient ID:  Antonieta Enriquez is a 52 y.o. female who presents for evaluation of Hypertension and Hypertrophic Cardiomyopathy    Review of patient's allergies indicates:  No Known Allergies   She has a diagnosis of HOCM.  Her last echo was 2019. She had a cardiac MR study 2021. She has been on hypertension therapy since 2016. A negative stress test was done in 2017. She was subsequently diagnosed with HOCM based on echocardiography.  She has an STEPHANIE and ROSEMARY.  Her gadolinium CMR was normal.  Her inferoseptal thickness was 2.7 cm.  Her ejection fraction by echo was 80%.  She has a 70 mm resting gradient with 120 gradient with Valsalva.  She has recognized to have 2+ MR. She exercises well.  She can climb 2 flights of stairs.    She has never been advised to have a defibrillator.  She has had syncope but only during a colonoscopy prep and a 2nd episode possibly related to bradycardia.  She is on low-dose metoprolol and has heart rates in the 40s.  She no longer has lightheadedness.  She has not had syncope.  She has a normal lipid profile.  She used to take lisinopril HCTZ 10/12.5 mg. She did not tolerate amlodipine due to fatigue symptoms.  Her blood pressure today is 146/82.  She has had palpitations in the past but none currently.  Most of her family members opted not to be tested for HOCM.    She works full-time.  She has been advised to lose weight.  She just got some of her materials from [place] after her move.  Included is for blood pressure monitoring system that she got this month.  She sees blood pressure is 130-140 is commonly.        Review of Systems   Constitutional: Positive for weight gain. Negative for chills, decreased appetite, diaphoresis, fever, malaise/fatigue, night sweats and weight loss.   HENT: Negative for congestion, ear discharge, ear pain, hearing loss, hoarse voice,  nosebleeds, odynophagia, sore throat, stridor and tinnitus.    Eyes: Negative for blurred vision, discharge, double vision, pain, photophobia, redness, vision loss in left eye, vision loss in right eye, visual disturbance and visual halos.   Cardiovascular: Positive for palpitations. Negative for chest pain, claudication, cyanosis, dyspnea on exertion, irregular heartbeat, leg swelling, near-syncope, orthopnea, paroxysmal nocturnal dyspnea and syncope.   Respiratory: Negative for cough, hemoptysis, shortness of breath, sleep disturbances due to breathing, snoring, sputum production and wheezing.    Endocrine: Negative for cold intolerance, heat intolerance, polydipsia, polyphagia and polyuria.   Hematologic/Lymphatic: Negative for adenopathy and bleeding problem. Does not bruise/bleed easily.   Skin: Negative for color change, dry skin, flushing, itching, nail changes, poor wound healing, rash, skin cancer, suspicious lesions and unusual hair distribution.   Musculoskeletal: Negative for arthritis, back pain, falls, gout, joint pain, joint swelling, muscle cramps, muscle weakness, myalgias, neck pain and stiffness.   Gastrointestinal: Negative for bloating, abdominal pain, anorexia, change in bowel habit, bowel incontinence, constipation, diarrhea, dysphagia, excessive appetite, flatus, heartburn, hematemesis, hematochezia, hemorrhoids, jaundice, melena, nausea and vomiting.   Genitourinary: Negative for bladder incontinence, decreased libido, dysuria, flank pain, frequency, genital sores, hematuria, hesitancy, incomplete emptying, nocturia and urgency.   Neurological: Negative for aphonia, brief paralysis, difficulty with concentration, disturbances in coordination, excessive daytime sleepiness, dizziness, focal weakness, headaches, light-headedness, loss of balance, numbness, paresthesias, seizures, sensory change, tremors, vertigo and weakness.   Psychiatric/Behavioral: Negative for altered mental status,  depression, hallucinations, memory loss, substance abuse, suicidal ideas and thoughts of violence. The patient does not have insomnia and is not nervous/anxious.    Allergic/Immunologic: Negative for hives and persistent infections.        Objective:       Vitals:    05/23/22 0813   BP: (!) 146/82   Pulse: (!) 47   SpO2: 98%   Weight: 95.9 kg (211 lb 8 oz)    Physical Exam  Constitutional:       General: She is not in acute distress.     Appearance: She is well-developed. She is not diaphoretic.   HENT:      Head: Normocephalic and atraumatic.      Nose: Nose normal.   Eyes:      General: No scleral icterus.        Right eye: No discharge.      Conjunctiva/sclera: Conjunctivae normal.      Pupils: Pupils are equal, round, and reactive to light.   Neck:      Thyroid: No thyromegaly.      Vascular: No JVD.      Trachea: No tracheal deviation.   Cardiovascular:      Rate and Rhythm: Regular rhythm. Bradycardia present.      Pulses:           Carotid pulses are 2+ on the right side and 2+ on the left side.       Radial pulses are 2+ on the right side and 2+ on the left side.        Dorsalis pedis pulses are 2+ on the right side and 2+ on the left side.        Posterior tibial pulses are 2+ on the right side and 2+ on the left side.      Heart sounds: Murmur heard.    Low-pitched harsh mid to late systolic murmur is present with a grade of 3/6 at the upper right sternal border and upper left sternal border.    No friction rub. No gallop.   Pulmonary:      Effort: Pulmonary effort is normal. No respiratory distress.      Breath sounds: Normal breath sounds. No stridor. No wheezing or rales.   Chest:      Chest wall: No tenderness.   Abdominal:      General: Bowel sounds are normal. There is no distension.      Palpations: Abdomen is soft. There is no mass.      Tenderness: There is no abdominal tenderness. There is no guarding or rebound.   Musculoskeletal:         General: No tenderness. Normal range of motion.       Cervical back: Normal range of motion and neck supple.   Lymphadenopathy:      Cervical: No cervical adenopathy.   Skin:     General: Skin is warm and dry.      Coloration: Skin is not pale.      Findings: No erythema or rash.   Neurological:      Mental Status: She is alert and oriented to person, place, and time.      Cranial Nerves: No cranial nerve deficit.      Coordination: Coordination normal.   Psychiatric:         Behavior: Behavior normal.         Thought Content: Thought content normal.         Judgment: Judgment normal.           Assessment:       1. Primary hypertension    2. Hypertrophic cardiomyopathy    3. HOCM (hypertrophic obstructive cardiomyopathy)    4. Obesity (BMI 35.0-39.9 without comorbidity)    5. Bradycardia    6. Nonrheumatic mitral valve regurgitation      Results for orders placed or performed during the hospital encounter of 02/13/16   Rapid Complete Blood Count (CBC)   Result Value Ref Range    WBC 5.57 3.90 - 12.70 K/uL    RBC 4.60 4.00 - 5.40 M/uL    Hemoglobin 11.4 (L) 12.0 - 16.0 g/dL    Hematocrit 35.6 (L) 37.0 - 48.5 %    MCV 77 (L) 82 - 98 fL    MCH 24.8 (L) 27.0 - 31.0 pg    MCHC 32.0 32.0 - 36.0 %    RDW 15.2 (H) 11.5 - 14.5 %    Platelets 177 150 - 350 K/uL    MPV 10.3 9.2 - 12.9 fL    Gran # (ANC) 2.2 1.8 - 7.7 K/uL    Lymph # 2.8 1.0 - 4.8 K/uL    Mono # 0.5 0.3 - 1.0 K/uL    Eos # 0.0 0.0 - 0.5 K/uL    Baso # 0.03 0.00 - 0.20 K/uL    Gran % 39.0 38.0 - 73.0 %    Lymph % 50.1 (H) 18.0 - 48.0 %    Mono % 9.7 4.0 - 15.0 %    Eosinophil % 0.7 0.0 - 8.0 %    Basophil % 0.5 0.0 - 1.9 %    Differential Method Automated    Rapid Basic Metabolic Panel (BMP)   Result Value Ref Range    Sodium 141 136 - 145 mmol/L    Potassium 3.9 3.5 - 5.1 mmol/L    Chloride 105 95 - 110 mmol/L    CO2 25 23 - 29 mmol/L    Glucose 88 70 - 110 mg/dL    BUN 15 6 - 20 mg/dL    Creatinine 0.9 0.5 - 1.4 mg/dL    Calcium 9.3 8.7 - 10.5 mg/dL    Anion Gap 11 8 - 16 mmol/L    eGFR if African American >60.0  >60 mL/min/1.73 m^2    eGFR if non African American >60.0 >60 mL/min/1.73 m^2   Drug screen panel, emergency   Result Value Ref Range    Benzodiazepines Negative     Methadone metabolites Negative     Cocaine (Metab.) Negative     Opiate Scrn, Ur Negative     Barbiturate Screen, Ur Negative     Amphetamine Screen, Ur Negative     THC Negative     Phencyclidine Negative     Creatinine, Urine 91.1 15.0 - 325.0 mg/dL    Toxicology Information SEE COMMENT    Protime-INR   Result Value Ref Range    Prothrombin Time 10.2 9.0 - 12.5 sec    INR 1.0 0.8 - 1.2   APTT   Result Value Ref Range    aPTT 26.1 21.0 - 32.0 sec   POCT glucose   Result Value Ref Range    POCT Glucose 88 70 - 110 mg/dL         Current Outpatient Medications:     cyanocobalamin 1,000 mcg/mL injection, Inject 1,000 mcg into the muscle., Disp: , Rfl:     lisinopriL (PRINIVIL,ZESTRIL) 5 MG tablet, Take 1 tablet (5 mg total) by mouth once daily., Disp: 90 tablet, Rfl: 4    metoprolol succinate (TOPROL-XL) 50 MG 24 hr tablet, Take 50 mg by mouth once., Disp: , Rfl:      Lab Results   Component Value Date    WBC 5.57 02/13/2016    RBC 4.60 02/13/2016    HGB 11.4 (L) 02/13/2016    HCT 35.6 (L) 02/13/2016    MCV 77 (L) 02/13/2016    MCH 24.8 (L) 02/13/2016    MCHC 32.0 02/13/2016    RDW 15.2 (H) 02/13/2016     02/13/2016    MPV 10.3 02/13/2016    GRAN 2.2 02/13/2016    GRAN 39.0 02/13/2016    LYMPH 2.8 02/13/2016    LYMPH 50.1 (H) 02/13/2016    MONO 0.5 02/13/2016    MONO 9.7 02/13/2016    EOS 0.0 02/13/2016    BASO 0.03 02/13/2016    EOSINOPHIL 0.7 02/13/2016    BASOPHIL 0.5 02/13/2016        CMP  Lab Results   Component Value Date     02/13/2016    K 3.9 02/13/2016     02/13/2016    CO2 25 02/13/2016    GLU 88 02/13/2016    BUN 15 02/13/2016    CREATININE 0.9 02/13/2016    CALCIUM 9.3 02/13/2016    ANIONGAP 11 02/13/2016    ESTGFRAFRICA >60.0 02/13/2016    EGFRNONAA >60.0 02/13/2016        No results found for: LABBLOO, LABURIN,  RESPIRATORYC, GSRESP         Results for orders placed or performed during the hospital encounter of 02/13/16   EKG 12-LEAD    Collection Time: 02/13/16  1:39 PM    Narrative    Test Reason : RICK  Blood Pressure : 130/70 mmHG  Vent. Rate : 049 BPM     Atrial Rate : 049 BPM     P-R Int : 160 ms          QRS Dur : 096 ms      QT Int : 462 ms       P-R-T Axes : 004 009 022 degrees     QTc Int : 417 ms    Sinus bradycardia  Voltage criteria for left ventricular hypertrophy  ST elevation, consider early repolarization  Abnormal ECG  No previous ECGs available  Confirmed by KAVITA SCHRADER MD ([id number]) on 2/14/2016 6:08:01 PM    Referred By: SELF REFERRAL           Confirmed By:KAVITA SCHRADER MD        X-Ray Chest PA And Lateral 02/13/2016 [id number] Final   CT Head Without Contrast 02/13/2016 [id number] Final            Plan:       Problem List Items Addressed This Visit        Cardiac/Vascular    HOCM (hypertrophic obstructive cardiomyopathy)     Records reviewed.  She has a heart murmur consistent with the diagnosis.  She functions well.  Metoprolol will be continued.  By echo review, 74 mm resting gradient with 120 mm gradient with Valsalva.    Obviously if she was not bradycardic metoprolol dosing would be increased.  Despite an impressive gradient she functions well.  Condition unchanged.           Hypertension - Primary     Lisinopril 5 mg added to metoprolol for better blood pressure control.  She has tolerated lisinopril in the past.           Relevant Medications    lisinopriL (PRINIVIL,ZESTRIL) 5 MG tablet    Bradycardia     Related to metoprolol usage.  She is asymptomatic.  She has had symptoms related to bradycardia with near syncope in the past.  Current dose to continue of metoprolol.           Nonrheumatic mitral valve regurgitation     Reported as moderate with underlying systolic motion of the anterior leaflet.  Not on unusual finding with hypertrophic cardiomyopathy patients.              Endocrine    Obesity (BMI  35.0-39.9 without comorbidity)     Continued weight loss encouraged.             Other Visit Diagnoses     Hypertrophic cardiomyopathy        Relevant Medications    lisinopriL (PRINIVIL,ZESTRIL) 5 MG tablet    Other Relevant Orders    EKG 12-lead    Echo             I will see her back in 6 months with an echo ordered prior to that visit.  She is functioning well.  Lisinopril added for better blood pressure control.      Thank you for allowing me to participate in your patient's care.        Govind Bourgeois MD  05/23/2022   8:47 AM

## 2022-05-23 NOTE — ASSESSMENT & PLAN NOTE
Reported as moderate with underlying systolic motion of the anterior leaflet.  Not on unusual finding with hypertrophic cardiomyopathy patients.

## 2022-05-23 NOTE — ASSESSMENT & PLAN NOTE
Related to metoprolol usage.  She is asymptomatic.  She has had symptoms related to bradycardia with near syncope in the past.  Current dose to continue of metoprolol.

## 2022-05-23 NOTE — ASSESSMENT & PLAN NOTE
Lisinopril 5 mg added to metoprolol for better blood pressure control.  She has tolerated lisinopril in the past.

## 2022-05-23 NOTE — ASSESSMENT & PLAN NOTE
Records reviewed.  She has a heart murmur consistent with the diagnosis.  She functions well.  Metoprolol will be continued.  By echo review, 74 mm resting gradient with 120 mm gradient with Valsalva.    Obviously if she was not bradycardic metoprolol dosing would be increased.  Despite an impressive gradient she functions well.  Condition unchanged.

## 2022-05-26 LAB — NONINV COLON CA DNA+OCC BLD SCRN STL QL: NEGATIVE

## 2022-05-30 ENCOUNTER — LAB VISIT (OUTPATIENT)
Dept: LAB | Facility: HOSPITAL | Age: 52
End: 2022-05-30
Attending: STUDENT IN AN ORGANIZED HEALTH CARE EDUCATION/TRAINING PROGRAM
Payer: COMMERCIAL

## 2022-05-30 DIAGNOSIS — D64.9 ANEMIA, UNSPECIFIED TYPE: ICD-10-CM

## 2022-05-30 DIAGNOSIS — Z13.6 SCREENING FOR CARDIOVASCULAR CONDITION: ICD-10-CM

## 2022-05-30 DIAGNOSIS — I42.2 HYPERTROPHIC CARDIOMYOPATHY: ICD-10-CM

## 2022-05-30 DIAGNOSIS — R73.9 HYPERGLYCEMIA: ICD-10-CM

## 2022-05-30 LAB
ALBUMIN SERPL BCP-MCNC: 3.8 G/DL (ref 3.5–5.2)
ALP SERPL-CCNC: 102 U/L (ref 38–126)
ALT SERPL W/O P-5'-P-CCNC: 20 U/L (ref 10–44)
ANION GAP SERPL CALC-SCNC: 7 MMOL/L (ref 8–16)
AST SERPL-CCNC: 24 U/L (ref 15–46)
BASOPHILS # BLD AUTO: 0.02 K/UL (ref 0–0.2)
BASOPHILS NFR BLD: 0.4 % (ref 0–1.9)
BILIRUB SERPL-MCNC: 0.6 MG/DL (ref 0.1–1)
CALCIUM SERPL-MCNC: 9.1 MG/DL (ref 8.7–10.5)
CHLORIDE SERPL-SCNC: 108 MMOL/L (ref 95–110)
CHOLEST SERPL-MCNC: 145 MG/DL (ref 120–199)
CHOLEST/HDLC SERPL: 2.7 {RATIO} (ref 2–5)
CO2 SERPL-SCNC: 28 MMOL/L (ref 23–29)
CREAT SERPL-MCNC: 1 MG/DL (ref 0.5–1.4)
DIFFERENTIAL METHOD: ABNORMAL
EOSINOPHIL # BLD AUTO: 0.1 K/UL (ref 0–0.5)
EOSINOPHIL NFR BLD: 1.5 % (ref 0–8)
ERYTHROCYTE [DISTWIDTH] IN BLOOD BY AUTOMATED COUNT: 15 % (ref 11.5–14.5)
EST. GFR  (AFRICAN AMERICAN): >60 ML/MIN/1.73 M^2
EST. GFR  (NON AFRICAN AMERICAN): >60 ML/MIN/1.73 M^2
ESTIMATED AVG GLUCOSE: 128 MG/DL (ref 68–131)
GLUCOSE SERPL-MCNC: 83 MG/DL (ref 70–110)
HBA1C MFR BLD: 6.1 % (ref 4–5.6)
HCT VFR BLD AUTO: 38.4 % (ref 37–48.5)
HDLC SERPL-MCNC: 53 MG/DL (ref 40–75)
HDLC SERPL: 36.6 % (ref 20–50)
HGB BLD-MCNC: 11.9 G/DL (ref 12–16)
IMM GRANULOCYTES # BLD AUTO: 0 K/UL (ref 0–0.04)
IMM GRANULOCYTES NFR BLD AUTO: 0 % (ref 0–0.5)
LDLC SERPL CALC-MCNC: 85.8 MG/DL (ref 63–159)
LYMPHOCYTES # BLD AUTO: 2 K/UL (ref 1–4.8)
LYMPHOCYTES NFR BLD: 42.6 % (ref 18–48)
MCH RBC QN AUTO: 25.7 PG (ref 27–31)
MCHC RBC AUTO-ENTMCNC: 31 G/DL (ref 32–36)
MCV RBC AUTO: 83 FL (ref 82–98)
MONOCYTES # BLD AUTO: 0.4 K/UL (ref 0.3–1)
MONOCYTES NFR BLD: 8 % (ref 4–15)
NEUTROPHILS # BLD AUTO: 2.2 K/UL (ref 1.8–7.7)
NEUTROPHILS NFR BLD: 47.5 % (ref 38–73)
NONHDLC SERPL-MCNC: 92 MG/DL
NRBC BLD-RTO: 0 /100 WBC
PLATELET # BLD AUTO: 198 K/UL (ref 150–450)
PMV BLD AUTO: 11 FL (ref 9.2–12.9)
POTASSIUM SERPL-SCNC: 4.7 MMOL/L (ref 3.5–5.1)
PROT SERPL-MCNC: 6.6 G/DL (ref 6–8.4)
RBC # BLD AUTO: 4.63 M/UL (ref 4–5.4)
SODIUM SERPL-SCNC: 143 MMOL/L (ref 136–145)
TRIGL SERPL-MCNC: 31 MG/DL (ref 30–150)
TSH SERPL DL<=0.005 MIU/L-ACNC: 2.18 UIU/ML (ref 0.4–4)
UUN UR-MCNC: 15 MG/DL (ref 7–17)
WBC # BLD AUTO: 4.62 K/UL (ref 3.9–12.7)

## 2022-05-30 PROCEDURE — 85025 COMPLETE CBC W/AUTO DIFF WBC: CPT | Mod: PO | Performed by: STUDENT IN AN ORGANIZED HEALTH CARE EDUCATION/TRAINING PROGRAM

## 2022-05-30 PROCEDURE — 83036 HEMOGLOBIN GLYCOSYLATED A1C: CPT | Performed by: STUDENT IN AN ORGANIZED HEALTH CARE EDUCATION/TRAINING PROGRAM

## 2022-05-30 PROCEDURE — 84443 ASSAY THYROID STIM HORMONE: CPT | Mod: PO | Performed by: STUDENT IN AN ORGANIZED HEALTH CARE EDUCATION/TRAINING PROGRAM

## 2022-05-30 PROCEDURE — 36415 COLL VENOUS BLD VENIPUNCTURE: CPT | Mod: PO | Performed by: STUDENT IN AN ORGANIZED HEALTH CARE EDUCATION/TRAINING PROGRAM

## 2022-05-30 PROCEDURE — 80061 LIPID PANEL: CPT | Performed by: STUDENT IN AN ORGANIZED HEALTH CARE EDUCATION/TRAINING PROGRAM

## 2022-05-30 PROCEDURE — 80053 COMPREHEN METABOLIC PANEL: CPT | Mod: PO | Performed by: STUDENT IN AN ORGANIZED HEALTH CARE EDUCATION/TRAINING PROGRAM

## 2022-05-31 ENCOUNTER — PATIENT MESSAGE (OUTPATIENT)
Dept: ADMINISTRATIVE | Facility: HOSPITAL | Age: 52
End: 2022-05-31
Payer: COMMERCIAL

## 2022-07-13 ENCOUNTER — TELEPHONE (OUTPATIENT)
Dept: FAMILY MEDICINE | Facility: CLINIC | Age: 52
End: 2022-07-13

## 2022-07-13 NOTE — TELEPHONE ENCOUNTER
----- Message from Jovany Arellano sent at 7/13/2022 11:40 AM CDT -----  Contact: pt  Type: Requesting to speak with nurse        Who Called: PT  Regarding: tested positive on yesterday. Would like medical advice   Would the patient rather a call back or a response via [facility]? Call back  Best Call Back Number: [phone]  Additional Information: very mild symptoms (shereen throat, headaches, and no taste)

## 2022-07-13 NOTE — TELEPHONE ENCOUNTER
Pt called to inform you that last week she experienced such a terrible headache, congestion and sore throat. She began taking Benadryl and Flonase per pharmacist's suggestion. Pt took an at home covid test yesterday and it's positive. She feels fine now. Just having mild symptoms. She just wanted to inform you that this is the second time for testing positive since February.

## 2022-07-23 ENCOUNTER — PATIENT MESSAGE (OUTPATIENT)
Dept: ADMINISTRATIVE | Facility: OTHER | Age: 52
End: 2022-07-23
Payer: COMMERCIAL

## 2022-09-23 ENCOUNTER — OFFICE VISIT (OUTPATIENT)
Dept: FAMILY MEDICINE | Facility: CLINIC | Age: 52
End: 2022-09-23
Payer: COMMERCIAL

## 2022-09-23 ENCOUNTER — TELEPHONE (OUTPATIENT)
Dept: FAMILY MEDICINE | Facility: CLINIC | Age: 52
End: 2022-09-23

## 2022-09-23 VITALS
TEMPERATURE: 98 F | BODY MASS INDEX: 38.93 KG/M2 | WEIGHT: 211.56 LBS | OXYGEN SATURATION: 97 % | SYSTOLIC BLOOD PRESSURE: 138 MMHG | HEIGHT: 62 IN | HEART RATE: 69 BPM | DIASTOLIC BLOOD PRESSURE: 86 MMHG

## 2022-09-23 DIAGNOSIS — R11.0 NAUSEA: ICD-10-CM

## 2022-09-23 DIAGNOSIS — A08.4 VIRAL GASTROENTERITIS: ICD-10-CM

## 2022-09-23 DIAGNOSIS — R19.7 DIARRHEA, UNSPECIFIED TYPE: Primary | ICD-10-CM

## 2022-09-23 LAB
CTP QC/QA: YES
POC MOLECULAR INFLUENZA A AGN: NEGATIVE
POC MOLECULAR INFLUENZA B AGN: NEGATIVE

## 2022-09-23 PROCEDURE — 3008F PR BODY MASS INDEX (BMI) DOCUMENTED: ICD-10-PCS | Mod: CPTII,S$GLB,, | Performed by: STUDENT IN AN ORGANIZED HEALTH CARE EDUCATION/TRAINING PROGRAM

## 2022-09-23 PROCEDURE — 3079F DIAST BP 80-89 MM HG: CPT | Mod: CPTII,S$GLB,, | Performed by: STUDENT IN AN ORGANIZED HEALTH CARE EDUCATION/TRAINING PROGRAM

## 2022-09-23 PROCEDURE — 99214 OFFICE O/P EST MOD 30 MIN: CPT | Mod: 25,S$GLB,, | Performed by: STUDENT IN AN ORGANIZED HEALTH CARE EDUCATION/TRAINING PROGRAM

## 2022-09-23 PROCEDURE — 4010F PR ACE/ARB THEARPY RXD/TAKEN: ICD-10-PCS | Mod: CPTII,S$GLB,, | Performed by: STUDENT IN AN ORGANIZED HEALTH CARE EDUCATION/TRAINING PROGRAM

## 2022-09-23 PROCEDURE — 96372 PR INJECTION,THERAP/PROPH/DIAG2ST, IM OR SUBCUT: ICD-10-PCS | Mod: S$GLB,,, | Performed by: STUDENT IN AN ORGANIZED HEALTH CARE EDUCATION/TRAINING PROGRAM

## 2022-09-23 PROCEDURE — 1159F PR MEDICATION LIST DOCUMENTED IN MEDICAL RECORD: ICD-10-PCS | Mod: CPTII,S$GLB,, | Performed by: STUDENT IN AN ORGANIZED HEALTH CARE EDUCATION/TRAINING PROGRAM

## 2022-09-23 PROCEDURE — 3079F PR MOST RECENT DIASTOLIC BLOOD PRESSURE 80-89 MM HG: ICD-10-PCS | Mod: CPTII,S$GLB,, | Performed by: STUDENT IN AN ORGANIZED HEALTH CARE EDUCATION/TRAINING PROGRAM

## 2022-09-23 PROCEDURE — 3044F PR MOST RECENT HEMOGLOBIN A1C LEVEL <7.0%: ICD-10-PCS | Mod: CPTII,S$GLB,, | Performed by: STUDENT IN AN ORGANIZED HEALTH CARE EDUCATION/TRAINING PROGRAM

## 2022-09-23 PROCEDURE — 4010F ACE/ARB THERAPY RXD/TAKEN: CPT | Mod: CPTII,S$GLB,, | Performed by: STUDENT IN AN ORGANIZED HEALTH CARE EDUCATION/TRAINING PROGRAM

## 2022-09-23 PROCEDURE — 99214 PR OFFICE/OUTPT VISIT, EST, LEVL IV, 30-39 MIN: ICD-10-PCS | Mod: 25,S$GLB,, | Performed by: STUDENT IN AN ORGANIZED HEALTH CARE EDUCATION/TRAINING PROGRAM

## 2022-09-23 PROCEDURE — 3008F BODY MASS INDEX DOCD: CPT | Mod: CPTII,S$GLB,, | Performed by: STUDENT IN AN ORGANIZED HEALTH CARE EDUCATION/TRAINING PROGRAM

## 2022-09-23 PROCEDURE — 87502 POCT INFLUENZA A/B MOLECULAR: ICD-10-PCS | Mod: QW,,, | Performed by: STUDENT IN AN ORGANIZED HEALTH CARE EDUCATION/TRAINING PROGRAM

## 2022-09-23 PROCEDURE — 3044F HG A1C LEVEL LT 7.0%: CPT | Mod: CPTII,S$GLB,, | Performed by: STUDENT IN AN ORGANIZED HEALTH CARE EDUCATION/TRAINING PROGRAM

## 2022-09-23 PROCEDURE — 87502 INFLUENZA DNA AMP PROBE: CPT | Mod: QW,,, | Performed by: STUDENT IN AN ORGANIZED HEALTH CARE EDUCATION/TRAINING PROGRAM

## 2022-09-23 PROCEDURE — 3075F SYST BP GE 130 - 139MM HG: CPT | Mod: CPTII,S$GLB,, | Performed by: STUDENT IN AN ORGANIZED HEALTH CARE EDUCATION/TRAINING PROGRAM

## 2022-09-23 PROCEDURE — 1159F MED LIST DOCD IN RCRD: CPT | Mod: CPTII,S$GLB,, | Performed by: STUDENT IN AN ORGANIZED HEALTH CARE EDUCATION/TRAINING PROGRAM

## 2022-09-23 PROCEDURE — 3075F PR MOST RECENT SYSTOLIC BLOOD PRESS GE 130-139MM HG: ICD-10-PCS | Mod: CPTII,S$GLB,, | Performed by: STUDENT IN AN ORGANIZED HEALTH CARE EDUCATION/TRAINING PROGRAM

## 2022-09-23 PROCEDURE — 96372 THER/PROPH/DIAG INJ SC/IM: CPT | Mod: S$GLB,,, | Performed by: STUDENT IN AN ORGANIZED HEALTH CARE EDUCATION/TRAINING PROGRAM

## 2022-09-23 PROCEDURE — 1160F PR REVIEW ALL MEDS BY PRESCRIBER/CLIN PHARMACIST DOCUMENTED: ICD-10-PCS | Mod: CPTII,S$GLB,, | Performed by: STUDENT IN AN ORGANIZED HEALTH CARE EDUCATION/TRAINING PROGRAM

## 2022-09-23 PROCEDURE — 1160F RVW MEDS BY RX/DR IN RCRD: CPT | Mod: CPTII,S$GLB,, | Performed by: STUDENT IN AN ORGANIZED HEALTH CARE EDUCATION/TRAINING PROGRAM

## 2022-09-23 RX ORDER — TRIAMCINOLONE ACETONIDE 40 MG/ML
80 INJECTION, SUSPENSION INTRA-ARTICULAR; INTRAMUSCULAR ONCE
Status: COMPLETED | OUTPATIENT
Start: 2022-09-23 | End: 2022-09-23

## 2022-09-23 RX ORDER — ONDANSETRON 4 MG/1
4 TABLET, FILM COATED ORAL EVERY 6 HOURS PRN
Qty: 30 TABLET | Refills: 0 | Status: SHIPPED | OUTPATIENT
Start: 2022-09-23

## 2022-09-23 RX ORDER — DIPHENOXYLATE HYDROCHLORIDE AND ATROPINE SULFATE 2.5; .025 MG/1; MG/1
1 TABLET ORAL 3 TIMES DAILY PRN
Qty: 30 TABLET | Refills: 0 | Status: SHIPPED | OUTPATIENT
Start: 2022-09-23 | End: 2022-10-03

## 2022-09-23 RX ADMIN — TRIAMCINOLONE ACETONIDE 80 MG: 40 INJECTION, SUSPENSION INTRA-ARTICULAR; INTRAMUSCULAR at 11:09

## 2022-09-23 NOTE — TELEPHONE ENCOUNTER
----- Message from Laura Nelson sent at 9/23/2022  8:24 AM CDT -----  Type:  Same Day Appointment Request    Caller is requesting a same day appointment.  Caller declined first available appointment listed below.    Name of Caller:Pt   When is the first available appointment? N/a   Symptoms:headaches, diarrhea, nausea  Best Call Back Number:[phone]  Additional Information:

## 2022-09-23 NOTE — PROGRESS NOTES
Patient ID: Antonieta Enriquez is a 52 y.o. female.     Chief Complaint: Headache and Diarrhea    Headache   This is a new problem. The current episode started in the past 7 days. The problem occurs daily. The problem has been waxing and waning. The pain is located in the Frontal region. The pain does not radiate. The quality of the pain is described as aching. The pain is moderate. Associated symptoms include muscle aches, nausea and weakness. Pertinent negatives include no coughing, dizziness, drainage, ear pain, eye pain, fever, phonophobia, photophobia, sinus pressure, sore throat, swollen glands, vomiting or weight loss. Nothing aggravates the symptoms. She has tried acetaminophen for the symptoms. The treatment provided mild relief.   Diarrhea   This is a new problem. The current episode started in the past 7 days. The problem occurs 2 to 4 times per day. The problem has been unchanged. The stool consistency is described as Watery. The patient states that diarrhea does not awaken her from sleep. Associated symptoms include headaches, myalgias and sweats. Pertinent negatives include no chills, coughing, fever, vomiting or weight loss. Nothing aggravates the symptoms. She has tried increased fluids for the symptoms. The treatment provided no relief.        Review of Systems  Review of Systems   Constitutional:  Negative for chills, fever and weight loss.   HENT:  Negative for ear pain, sinus pressure, sinus pain and sore throat.    Eyes:  Negative for photophobia, pain and discharge.   Respiratory:  Negative for cough and shortness of breath.    Cardiovascular:  Negative for chest pain and leg swelling.   Gastrointestinal:  Positive for diarrhea and nausea. Negative for vomiting.   Genitourinary:  Negative for urgency.   Musculoskeletal:  Positive for myalgias.   Skin:  Negative for rash.   Neurological:  Positive for weakness and headaches. Negative for dizziness.   Psychiatric/Behavioral:  Negative for  "depression.    All other systems reviewed and are negative.    Currently Medications  Current Outpatient Medications on File Prior to Visit   Medication Sig Dispense Refill    lisinopriL (PRINIVIL,ZESTRIL) 5 MG tablet Take 1 tablet (5 mg total) by mouth once daily. 90 tablet 4    metoprolol succinate (TOPROL-XL) 50 MG 24 hr tablet Take 50 mg by mouth once.      cyanocobalamin 1,000 mcg/mL injection Inject 1,000 mcg into the muscle.       No current facility-administered medications on file prior to visit.       Physical  Exam  Vitals:    09/23/22 1120   BP: 138/86   BP Location: Left arm   Patient Position: Sitting   Pulse: 69   Temp: 98.3 °F (36.8 °C)   SpO2: 97%   Weight: 95.9 kg (211 lb 8.5 oz)   Height: 5' 2" (1.575 m)      Body mass index is 38.69 kg/m².    Physical Exam  Vitals and nursing note reviewed.   Constitutional:       General: She is not in acute distress.     Appearance: She is not ill-appearing.   HENT:      Head: Normocephalic and atraumatic.      Right Ear: External ear normal.      Left Ear: External ear normal.      Nose: Nose normal.      Mouth/Throat:      Mouth: Mucous membranes are moist.   Eyes:      Extraocular Movements: Extraocular movements intact.      Conjunctiva/sclera: Conjunctivae normal.   Cardiovascular:      Rate and Rhythm: Normal rate and regular rhythm.      Pulses: Normal pulses.      Heart sounds: No murmur heard.  Pulmonary:      Effort: Pulmonary effort is normal. No respiratory distress.      Breath sounds: No wheezing.   Abdominal:      General: There is no distension.      Palpations: Abdomen is soft. There is no mass.      Tenderness: There is no abdominal tenderness.   Musculoskeletal:         General: No swelling.      Cervical back: Normal range of motion.   Skin:     Coloration: Skin is not jaundiced.      Findings: No rash.   Neurological:      General: No focal deficit present.      Mental Status: She is alert and oriented to person, place, and time. "   Psychiatric:         Mood and Affect: Mood normal.         Thought Content: Thought content normal.       Labs:    Complete Blood Count  Lab Results   Component Value Date    RBC 4.63 05/30/2022    HGB 11.9 (L) 05/30/2022    HCT 38.4 05/30/2022    MCV 83 05/30/2022    MCH 25.7 (L) 05/30/2022    MCHC 31.0 (L) 05/30/2022    RDW 15.0 (H) 05/30/2022     05/30/2022    MPV 11.0 05/30/2022    GRAN 2.2 05/30/2022    GRAN 47.5 05/30/2022    LYMPH 2.0 05/30/2022    LYMPH 42.6 05/30/2022    MONO 0.4 05/30/2022    MONO 8.0 05/30/2022    EOS 0.1 05/30/2022    BASO 0.02 05/30/2022    EOSINOPHIL 1.5 05/30/2022    BASOPHIL 0.4 05/30/2022    DIFFMETHOD Automated 05/30/2022       Comprehensive Metabolic Panel  Lab Results   Component Value Date    GLU 83 05/30/2022    BUN 15 05/30/2022    CREATININE 1.00 05/30/2022     05/30/2022    K 4.7 05/30/2022     05/30/2022    PROT 6.6 05/30/2022    ALBUMIN 3.8 05/30/2022    BILITOT 0.6 05/30/2022    AST 24 05/30/2022    ALKPHOS 102 05/30/2022    CO2 28 05/30/2022    ALT 20 05/30/2022    ANIONGAP 7 (L) 05/30/2022    EGFRNONAA >60.0 05/30/2022    ESTGFRAFRICA >60.0 05/30/2022       TSH  Lab Results   Component Value Date    TSH 2.180 05/30/2022       Imaging:  X-Ray Chest PA And Lateral  Narrative: PA and Lateral Chest x-ray    Clinical Indication: Chest pain.  Essential hypertension.       Findings:    No comparison studies are available.      The lungs are clear. The cardiac silhouette size is normal. The trachea is midline and the mediastinal width is normal. Negative for focal infiltrate, effusion or pneumothorax. Pulmonary vasculature is normal. Negative for osseous abnormalities. There are degenerative changes of the spine. There is tortuosity of the descending thoracic aorta. There are cholecystectomy clips.  Impression:        1.  Negative for acute process involving the chest.  2.  Incidental findings as noted above.    Electronically signed by: SANTIAGO FARRELL  MD  Date:     02/13/16  Time:    14:13   CT Head Without Contrast  Narrative: Head CT scan without contrast    Clinical Indication: Unspecified speech disturbances.    Findings:  No comparison studies are available.   The ventricles are midline and the CSF spaces are normal. The gray-white matter junction is well preserved. Negative for intracranial vascular abnormalities. Negative for mass, mass effect, cerebral edema, hemorrhage or abnormal fluid collections.       The skull and scalp are intact.    The   paranasal sinuses, mastoid air cells, middle ears and ear canals are clear. The globes are intact.  Impression:        1.  Negative for acute intracranial process. Negative for hemorrhage, or skull fracture.    Electronically signed by: SANTIAGO FARRELL MD  Date:     02/13/16  Time:    14:06       Assessment/Plan:    Problem List Items Addressed This Visit    None  Visit Diagnoses       Diarrhea, unspecified type    -  Primary    Relevant Medications    diphenoxylate-atropine 2.5-0.025 mg (LOMOTIL) 2.5-0.025 mg per tablet    Other Relevant Orders    POCT Influenza A/B Molecular (Completed)    Viral gastroenteritis        Relevant Medications    triamcinolone acetonide injection 80 mg (Completed) (Start on 9/23/2022  1:00 PM)    Nausea        Relevant Medications    ondansetron (ZOFRAN) 4 MG tablet             Discussed how to stay healthy including: diet, exercise, refraining from smoking and discussed screening exams / tests needed for age, sex and family Hx.      Rebel Kimball MD

## 2022-09-28 ENCOUNTER — HOSPITAL ENCOUNTER (OUTPATIENT)
Dept: RADIOLOGY | Facility: HOSPITAL | Age: 52
Discharge: HOME | End: 2022-09-28
Attending: STUDENT IN AN ORGANIZED HEALTH CARE EDUCATION/TRAINING PROGRAM
Payer: COMMERCIAL

## 2022-09-28 DIAGNOSIS — Z12.31 SCREENING MAMMOGRAM FOR HIGH-RISK PATIENT: ICD-10-CM

## 2022-09-28 PROCEDURE — 77063 BREAST TOMOSYNTHESIS BI: CPT | Mod: TC,PO

## 2022-09-28 PROCEDURE — 77067 SCR MAMMO BI INCL CAD: CPT | Mod: TC,PO

## 2022-09-29 ENCOUNTER — HOSPITAL ENCOUNTER (EMERGENCY)
Facility: HOSPITAL | Age: 52
Discharge: HOME | End: 2022-09-29
Attending: EMERGENCY MEDICINE
Payer: COMMERCIAL

## 2022-09-29 VITALS
BODY MASS INDEX: 38.64 KG/M2 | DIASTOLIC BLOOD PRESSURE: 93 MMHG | HEIGHT: 62 IN | HEART RATE: 64 BPM | OXYGEN SATURATION: 97 % | SYSTOLIC BLOOD PRESSURE: 175 MMHG | WEIGHT: 210 LBS | RESPIRATION RATE: 16 BRPM | TEMPERATURE: 98 F

## 2022-09-29 DIAGNOSIS — M62.838 TRAPEZIUS MUSCLE SPASM: Primary | ICD-10-CM

## 2022-09-29 PROCEDURE — 63600175 PHARM REV CODE 636 W HCPCS: Mod: ER | Performed by: EMERGENCY MEDICINE

## 2022-09-29 PROCEDURE — 96372 THER/PROPH/DIAG INJ SC/IM: CPT | Performed by: EMERGENCY MEDICINE

## 2022-09-29 PROCEDURE — 99284 EMERGENCY DEPT VISIT MOD MDM: CPT | Mod: ER

## 2022-09-29 RX ORDER — KETOROLAC TROMETHAMINE 30 MG/ML
15 INJECTION, SOLUTION INTRAMUSCULAR; INTRAVENOUS
Status: COMPLETED | OUTPATIENT
Start: 2022-09-29 | End: 2022-09-29

## 2022-09-29 RX ORDER — DEXAMETHASONE SODIUM PHOSPHATE 4 MG/ML
8 INJECTION, SOLUTION INTRA-ARTICULAR; INTRALESIONAL; INTRAMUSCULAR; INTRAVENOUS; SOFT TISSUE
Status: COMPLETED | OUTPATIENT
Start: 2022-09-29 | End: 2022-09-29

## 2022-09-29 RX ORDER — LIDOCAINE 50 MG/G
1 PATCH TOPICAL DAILY
Qty: 15 PATCH | Refills: 0 | Status: SHIPPED | OUTPATIENT
Start: 2022-09-29

## 2022-09-29 RX ORDER — KETOROLAC TROMETHAMINE 10 MG/1
10 TABLET, FILM COATED ORAL EVERY 6 HOURS
Qty: 20 TABLET | Refills: 0 | Status: SHIPPED | OUTPATIENT
Start: 2022-09-29 | End: 2022-10-04

## 2022-09-29 RX ADMIN — KETOROLAC TROMETHAMINE 15 MG: 30 INJECTION, SOLUTION INTRAMUSCULAR at 03:09

## 2022-09-29 RX ADMIN — DEXAMETHASONE SODIUM PHOSPHATE 8 MG: 4 INJECTION, SOLUTION INTRA-ARTICULAR; INTRALESIONAL; INTRAMUSCULAR; INTRAVENOUS; SOFT TISSUE at 03:09

## 2022-09-29 NOTE — DISCHARGE INSTRUCTIONS
You have a trapezius muscle spasm. You can take Tylenol 1 gram every 8 hours, and ibuprofen 800 mg every 8 hours; this means you can take pain medicine once every 4 hours.  It was nice to take care of you - hope you feel better!

## 2022-09-29 NOTE — FIRST PROVIDER EVALUATION
Emergency Department TeleTriage Encounter Note      CHIEF COMPLAINT    Chief Complaint   Patient presents with    Neck Pain     I have been having this neck pain for years and it goes into my left scapula. I haven't ever had xrays. I did PT by my doctor.        VITAL SIGNS   Initial Vitals [09/29/22 1324]   BP Pulse Resp Temp SpO2   (!) 175/93 64 16 97.8 °F (36.6 °C) 97 %      MAP       --            ALLERGIES    Review of patient's allergies indicates:  No Known Allergies    PROVIDER TRIAGE NOTE  TeleTriage Note: Antonieta Enriquez, a nontoxic/well appearing, 52 y.o. female, presented to the ED with c/o left sided neck/shoulder pain that began 2 days ago. Attempted ice/heat and is not getting relief. Denies injury or trauma.     All ED beds are full at present; patient notified of this status.  Patient seen and medically screened by Nurse Practitioner via teletriage.  Patient is stable to return to the waiting room and will be placed in an ED bed when available.  Care will be transferred to an alternate provider when patient has been placed in an Exam Room from the [facility] for physical exam, additional orders, and disposition.  1:37 PM Clare Hanson DNP, FNP-C        ORDERS  Labs Reviewed - No data to display    ED Orders (720h ago, onward)      None              Virtual Visit Note: The provider triage portion of this emergency department evaluation and documentation was performed via [employer], a HIPAA-compliant telemedicine application, in concert with a tele-presenter in the room. A face to face patient evaluation with one of my colleagues will occur once the patient is placed in an emergency department room.      DISCLAIMER: This note was prepared with [employer] voice recognition transcription software. Garbled syntax, mangled pronouns, and other bizarre constructions may be attributed to that software system.

## 2022-09-29 NOTE — ED PROVIDER NOTES
Encounter Date: 2022       History     Chief Complaint   Patient presents with    Neck Pain     I have been having this neck pain for years and it goes into my left scapula. I haven't ever had xrays. I did PT by my doctor.      HPI  52-year-old female with a history of hypertension presenting with left neck/shoulder pain  Patient states onset of symptoms was about a year ago when she fell from sitting, landing on her left side, and did not seek evaluation at that time, and now has pain to where she cannot sleep at night  Has tried Excedrin without much improvement in symptoms, denies weakness, numbness distally    Review of patient's allergies indicates:  No Known Allergies  Past Medical History:   Diagnosis Date    Hypertension     Hypertrophic cardiomyopathy     Migraine      Past Surgical History:   Procedure Laterality Date    BREAST BIOPSY Right     2014 neg     SECTION      CHOLECYSTECTOMY      GASTRIC BYPASS N/A 2009    HYSTERECTOMY       Family History   Problem Relation Age of Onset    Arthritis Mother     Diabetes Mother     Hypertension Mother     Cancer Father     Diabetes Brother     Diabetes Brother      Social History     Tobacco Use    Smoking status: Never    Smokeless tobacco: Never   Substance Use Topics    Alcohol use: Yes    Drug use: Never     Review of Systems   Constitutional:  Negative for fever.   HENT:  Negative for sore throat.    Respiratory:  Negative for shortness of breath.    Cardiovascular:  Negative for chest pain.   Gastrointestinal:  Negative for nausea.   Genitourinary:  Negative for dysuria.   Musculoskeletal:  Positive for neck pain. Negative for back pain and neck stiffness.   Skin:  Negative for rash.   Neurological:  Negative for weakness.   Hematological:  Does not bruise/bleed easily.     Physical Exam     Initial Vitals [22 1324]   BP Pulse Resp Temp SpO2   (!) 175/93 64 16 97.8 °F (36.6 °C) 97 %      MAP       --         Physical Exam    Nursing note  and vitals reviewed.  Constitutional:   EXAM  General: Awake, alert and oriented. No acute distress.     Head: normocephalic and atraumatic     Eyes: Conjunctivae are clear without exudates or hemorrhage. Sclera is non-icteric. EOM are intact. Eyelids are normal in appearance without swelling or lesions.     Ears: The external ear and ear canal are non-tender and without swelling. The canal is clear without discharge. Hearing intact.     Nose: Nares are patent bilaterally.     Neck: The neck is supple. Trachea is midline. Full ROM.     Cardiac: Regular rate.     Respiratory: No signs of respiratory distress. No audible wheezes.     Abdominal: Non-distended.     Extremities: Asymmetry of L trapezius vs R. Muscle spasm L trapezius. Full ROM at L shoulder. No deficits. No midline c spine ttp.      Skin: Appropriate color for ethnicity.     Neurological: The patient is awake, alert and oriented to person, place, and time with normal speech.     Psychiatric: Appropriate mood and affect.     In light of current/ongoing global covid-19 pandemic, all my encounters w pt were with full ppe including but not limited to gown, gloves, n95, eye protection OR from >6 ft away.           ED Course   Procedures  Labs Reviewed - No data to display       Imaging Results    None          Medications   ketorolac injection 15 mg (15 mg Intramuscular Given 9/29/22 1530)   dexamethasone injection 8 mg (8 mg Intramuscular Given 9/29/22 1530)     Medical Decision Making:   ED Management:  Very pleasant patient pw most likely trapezius muscle spasm.  Likely ongoing for many months, as she has obvious asymmetry on visual exam.  No midline C-spine tenderness, no focal deficits.  Toradol, Decadron, lidocaine patch, expectant management. Strict return precautions discussed with patient expressing understanding of instructions, and all questions answered.                           Clinical Impression:   Final diagnoses:  [M62.838] Trapezius muscle  spasm (Primary)      ED Disposition Condition    Discharge Stable          ED Prescriptions       Medication Sig Dispense Start Date End Date Auth. Provider    LIDOcaine (LIDODERM) 5 % Place 1 patch onto the skin once daily. Remove & Discard patch within 12 hours or as directed by MD 15 patch 9/29/2022 -- Tejal Quinn MD    ketorolac (TORADOL) 10 mg tablet Take 1 tablet (10 mg total) by mouth every 6 (six) hours. for 5 days 20 tablet 9/29/2022 10/4/2022 Tejal Quinn MD          Follow-up Information       Follow up With Specialties Details Why Contact Info    Rebel Kimball MD Internal Medicine   [address] [address]  [phone]               Tejal Quinn MD  09/29/22 5725

## 2022-10-03 DIAGNOSIS — R92.8 ABNORMAL MAMMOGRAM: Primary | ICD-10-CM

## 2022-10-07 ENCOUNTER — HOSPITAL ENCOUNTER (OUTPATIENT)
Dept: RADIOLOGY | Facility: HOSPITAL | Age: 52
Discharge: HOME | End: 2022-10-07
Attending: STUDENT IN AN ORGANIZED HEALTH CARE EDUCATION/TRAINING PROGRAM
Payer: COMMERCIAL

## 2022-10-07 DIAGNOSIS — R92.8 ABNORMAL MAMMOGRAM: ICD-10-CM

## 2022-10-07 PROCEDURE — 76642 ULTRASOUND BREAST LIMITED: CPT | Mod: TC,PO,LT

## 2022-10-14 ENCOUNTER — OFFICE VISIT (OUTPATIENT)
Dept: FAMILY MEDICINE | Facility: CLINIC | Age: 52
End: 2022-10-14
Payer: COMMERCIAL

## 2022-10-14 DIAGNOSIS — M79.10 MYALGIA: ICD-10-CM

## 2022-10-14 DIAGNOSIS — R53.83 FATIGUE AFTER VACCINATION: Primary | ICD-10-CM

## 2022-10-14 DIAGNOSIS — T50.Z95A FATIGUE AFTER VACCINATION: Primary | ICD-10-CM

## 2022-10-14 PROCEDURE — 1159F PR MEDICATION LIST DOCUMENTED IN MEDICAL RECORD: ICD-10-PCS | Mod: CPTII,95,, | Performed by: STUDENT IN AN ORGANIZED HEALTH CARE EDUCATION/TRAINING PROGRAM

## 2022-10-14 PROCEDURE — 3044F HG A1C LEVEL LT 7.0%: CPT | Mod: CPTII,95,, | Performed by: STUDENT IN AN ORGANIZED HEALTH CARE EDUCATION/TRAINING PROGRAM

## 2022-10-14 PROCEDURE — 99213 OFFICE O/P EST LOW 20 MIN: CPT | Mod: 95,,, | Performed by: STUDENT IN AN ORGANIZED HEALTH CARE EDUCATION/TRAINING PROGRAM

## 2022-10-14 PROCEDURE — 4010F PR ACE/ARB THEARPY RXD/TAKEN: ICD-10-PCS | Mod: CPTII,95,, | Performed by: STUDENT IN AN ORGANIZED HEALTH CARE EDUCATION/TRAINING PROGRAM

## 2022-10-14 PROCEDURE — 1159F MED LIST DOCD IN RCRD: CPT | Mod: CPTII,95,, | Performed by: STUDENT IN AN ORGANIZED HEALTH CARE EDUCATION/TRAINING PROGRAM

## 2022-10-14 PROCEDURE — 99213 PR OFFICE/OUTPT VISIT, EST, LEVL III, 20-29 MIN: ICD-10-PCS | Mod: 95,,, | Performed by: STUDENT IN AN ORGANIZED HEALTH CARE EDUCATION/TRAINING PROGRAM

## 2022-10-14 PROCEDURE — 3044F PR MOST RECENT HEMOGLOBIN A1C LEVEL <7.0%: ICD-10-PCS | Mod: CPTII,95,, | Performed by: STUDENT IN AN ORGANIZED HEALTH CARE EDUCATION/TRAINING PROGRAM

## 2022-10-14 PROCEDURE — 4010F ACE/ARB THERAPY RXD/TAKEN: CPT | Mod: CPTII,95,, | Performed by: STUDENT IN AN ORGANIZED HEALTH CARE EDUCATION/TRAINING PROGRAM

## 2022-10-14 PROCEDURE — 1160F RVW MEDS BY RX/DR IN RCRD: CPT | Mod: CPTII,95,, | Performed by: STUDENT IN AN ORGANIZED HEALTH CARE EDUCATION/TRAINING PROGRAM

## 2022-10-14 PROCEDURE — 1160F PR REVIEW ALL MEDS BY PRESCRIBER/CLIN PHARMACIST DOCUMENTED: ICD-10-PCS | Mod: CPTII,95,, | Performed by: STUDENT IN AN ORGANIZED HEALTH CARE EDUCATION/TRAINING PROGRAM

## 2022-10-14 NOTE — PROGRESS NOTES
Primary Care Virtual Visit    The patient location is: [place], [place]  The chief complaint leading to consultation is:  fatigue, myalgia  Visit type: Virtual visit with synchronous audio and video  Total time spent with patient:  9 minutes  Each patient to whom he or she provides medical services by telemedicine is:  (1) informed of the relationship between the physician and patient and the respective role of any other health care provider with respect to management of the patient; and (2) notified that he or she may decline to receive medical services by telemedicine and may withdraw from such care at any time.     Patient ID: Antonieta Enriquez is a 52 y.o. female.     Abdominal Pain  This is a new problem. The current episode started in the past 7 days. The onset quality is sudden. The problem occurs 2 to 4 times per day. The most recent episode lasted 2 Hours. The problem has been gradually worsening. The pain is located in the right flank. The pain is at a severity of 5/10. The pain is mild. The quality of the pain is aching. Associated symptoms include anorexia and headaches. Pertinent negatives include no arthralgias, belching, constipation, diarrhea, dysuria, fever, flatus, frequency, hematochezia, hematuria, melena, myalgias, nausea, vomiting or weight loss. She has tried nothing for the symptoms. The treatment provided no relief. Her past medical history is significant for gallstones and GERD. There is no history of abdominal surgery, colon cancer, Crohn's disease, irritable bowel syndrome, pancreatitis, PUD or ulcerative colitis. Patient's medical history does not include kidney stones and UTI.    Patient states that she received the influenza vaccine 2 days ago. Since then, she has felt run down and tired. She has also had a daily headache. She had been taking advil for the headache. No nausea, vomiting or diarrhea.     Review of Systems  Review of Systems   Constitutional:  Negative for fever and weight  loss.   HENT:  Negative for ear pain and sinus pain.    Eyes:  Negative for discharge.   Respiratory:  Negative for cough and shortness of breath.    Cardiovascular:  Negative for chest pain and leg swelling.   Gastrointestinal:  Positive for abdominal pain and anorexia. Negative for constipation, diarrhea, flatus, hematochezia, melena, nausea and vomiting.   Genitourinary:  Negative for dysuria, frequency, hematuria and urgency.   Musculoskeletal:  Negative for arthralgias and myalgias.   Skin:  Negative for rash.   Neurological:  Positive for headaches. Negative for weakness.   Psychiatric/Behavioral:  Negative for depression.    All other systems reviewed and are negative.    Currently Medications  Current Outpatient Medications on File Prior to Visit   Medication Sig Dispense Refill    cyanocobalamin 1,000 mcg/mL injection Inject 1,000 mcg into the muscle.      LIDOcaine (LIDODERM) 5 % Place 1 patch onto the skin once daily. Remove & Discard patch within 12 hours or as directed by MD 15 patch 0    lisinopriL (PRINIVIL,ZESTRIL) 5 MG tablet Take 1 tablet (5 mg total) by mouth once daily. 90 tablet 4    metoprolol succinate (TOPROL-XL) 50 MG 24 hr tablet Take 50 mg by mouth once.      ondansetron (ZOFRAN) 4 MG tablet Take 1 tablet (4 mg total) by mouth every 6 (six) hours as needed for Nausea. 30 tablet 0     No current facility-administered medications on file prior to visit.       Physical  Exam  There were no vitals filed for this visit.   Physical Exam  Constitutional:       Appearance: Normal appearance.   HENT:      Head: Normocephalic and atraumatic.   Eyes:      Extraocular Movements: Extraocular movements intact.   Skin:     Coloration: Skin is not pale.   Neurological:      Mental Status: She is alert and oriented to person, place, and time.   Psychiatric:         Mood and Affect: Mood normal.       Labs:    Complete Blood Count  Lab Results   Component Value Date    RBC 4.63 05/30/2022    HGB 11.9 (L)  05/30/2022    HCT 38.4 05/30/2022    MCV 83 05/30/2022    MCH 25.7 (L) 05/30/2022    MCHC 31.0 (L) 05/30/2022    RDW 15.0 (H) 05/30/2022     05/30/2022    MPV 11.0 05/30/2022    GRAN 2.2 05/30/2022    GRAN 47.5 05/30/2022    LYMPH 2.0 05/30/2022    LYMPH 42.6 05/30/2022    MONO 0.4 05/30/2022    MONO 8.0 05/30/2022    EOS 0.1 05/30/2022    BASO 0.02 05/30/2022    EOSINOPHIL 1.5 05/30/2022    BASOPHIL 0.4 05/30/2022    DIFFMETHOD Automated 05/30/2022       Comprehensive Metabolic Panel  Lab Results   Component Value Date    GLU 83 05/30/2022    BUN 15 05/30/2022    CREATININE 1.00 05/30/2022     05/30/2022    K 4.7 05/30/2022     05/30/2022    PROT 6.6 05/30/2022    ALBUMIN 3.8 05/30/2022    BILITOT 0.6 05/30/2022    AST 24 05/30/2022    ALKPHOS 102 05/30/2022    CO2 28 05/30/2022    ALT 20 05/30/2022    ANIONGAP 7 (L) 05/30/2022    EGFRNONAA >60.0 05/30/2022    ESTGFRAFRICA >60.0 05/30/2022       TSH  Lab Results   Component Value Date    TSH 2.180 05/30/2022       Imaging:  US Breast Left Limited  Result:   US Breast Left Limited     History:  Patient is 52 y.o. and is seen for diagnostic imaging.    Films Compared:  Prior images (if available) were compared.     FINDINGS:   Targeted ultrasound of the 2 o'clock position of the left breast, 11 cm   from the nipple, reveals the slightly lobular oblong hypoechoic soft   tissue mass, well-defined with posterior acoustic shadowing measuring 1.2   x 0.9 x 0.8 cm.  No other solid or cystic masses are identified.     Evaluation of the axillary region fails to reveal evidence for solid or   cystic masses or abnormal lymph nodes.     IMPRESSION:   1.  1.2 x 0.9 x 0.8 cm round hypoechoic solid nodule with posterior   acoustic shadowing.  This is viewed with suspicion.  Biopsy recommended.     2.  The results of this ultrasound was discussed with the patient at 11:55   hours on October 7, 2022.  The patient has had prior mammograms performed   at an outside  state ([place]).  The patient will attempt to obtain those   mammogram images.  If this lesion has not changed from multiple years,   biopsy may not be necessary.  However, if the mammograms cannot be   obtained, continuing with biopsy is recommended.     BI-RADS Category:   Overall: 4 - Suspicious       Recommendation:  Ultrasound-guided biopsy is recommended.    Your estimated lifetime risk of breast cancer (to age 85) based on   Tyrer-Cuzick risk assessment model is Tyrer-Cuzick: 9.56 %. According to   the American Cancer Society, patients with a lifetime breast cancer risk   of 20% or higher might benefit from supplemental screening tests.      Assessment/Plan:    Problem List Items Addressed This Visit    None  Visit Diagnoses       Fatigue after vaccination    -  Primary    Myalgia                Continue supportive care. Add tylenol. Follow directions on bottle.     Rebel Kimball MD

## 2022-10-18 ENCOUNTER — TELEPHONE (OUTPATIENT)
Dept: FAMILY MEDICINE | Facility: CLINIC | Age: 52
End: 2022-10-18

## 2022-10-18 ENCOUNTER — LAB VISIT (OUTPATIENT)
Dept: LAB | Facility: HOSPITAL | Age: 52
End: 2022-10-18
Attending: STUDENT IN AN ORGANIZED HEALTH CARE EDUCATION/TRAINING PROGRAM
Payer: COMMERCIAL

## 2022-10-18 ENCOUNTER — OFFICE VISIT (OUTPATIENT)
Dept: FAMILY MEDICINE | Facility: CLINIC | Age: 52
End: 2022-10-18
Payer: COMMERCIAL

## 2022-10-18 DIAGNOSIS — R51.9 ACUTE NONINTRACTABLE HEADACHE, UNSPECIFIED HEADACHE TYPE: ICD-10-CM

## 2022-10-18 DIAGNOSIS — R51.9 ACUTE NONINTRACTABLE HEADACHE, UNSPECIFIED HEADACHE TYPE: Primary | ICD-10-CM

## 2022-10-18 LAB
ALBUMIN SERPL BCP-MCNC: 3.9 G/DL (ref 3.5–5.2)
ALP SERPL-CCNC: 140 U/L (ref 38–126)
ALT SERPL W/O P-5'-P-CCNC: 30 U/L (ref 10–44)
ANION GAP SERPL CALC-SCNC: 9 MMOL/L (ref 8–16)
AST SERPL-CCNC: 37 U/L (ref 15–46)
BASOPHILS # BLD AUTO: 0.02 K/UL (ref 0–0.2)
BASOPHILS NFR BLD: 0.2 % (ref 0–1.9)
BILIRUB SERPL-MCNC: 0.6 MG/DL (ref 0.1–1)
CALCIUM SERPL-MCNC: 9.1 MG/DL (ref 8.7–10.5)
CHLORIDE SERPL-SCNC: 101 MMOL/L (ref 95–110)
CO2 SERPL-SCNC: 28 MMOL/L (ref 23–29)
CREAT SERPL-MCNC: 1.15 MG/DL (ref 0.5–1.4)
DIFFERENTIAL METHOD: ABNORMAL
EOSINOPHIL # BLD AUTO: 0 K/UL (ref 0–0.5)
EOSINOPHIL NFR BLD: 0 % (ref 0–8)
ERYTHROCYTE [DISTWIDTH] IN BLOOD BY AUTOMATED COUNT: 14.3 % (ref 11.5–14.5)
EST. GFR  (NO RACE VARIABLE): 56.6 ML/MIN/1.73 M^2
FOLATE SERPL-MCNC: 13.2 NG/ML (ref 4–24)
GLUCOSE SERPL-MCNC: 118 MG/DL (ref 70–110)
HCT VFR BLD AUTO: 36 % (ref 37–48.5)
HGB BLD-MCNC: 11.7 G/DL (ref 12–16)
IMM GRANULOCYTES # BLD AUTO: 0.05 K/UL (ref 0–0.04)
IMM GRANULOCYTES NFR BLD AUTO: 0.5 % (ref 0–0.5)
LYMPHOCYTES # BLD AUTO: 0.9 K/UL (ref 1–4.8)
LYMPHOCYTES NFR BLD: 9.9 % (ref 18–48)
MAGNESIUM SERPL-MCNC: 2 MG/DL (ref 1.6–2.6)
MCH RBC QN AUTO: 26.8 PG (ref 27–31)
MCHC RBC AUTO-ENTMCNC: 32.5 G/DL (ref 32–36)
MCV RBC AUTO: 82 FL (ref 82–98)
MONOCYTES # BLD AUTO: 0.8 K/UL (ref 0.3–1)
MONOCYTES NFR BLD: 8 % (ref 4–15)
NEUTROPHILS # BLD AUTO: 7.7 K/UL (ref 1.8–7.7)
NEUTROPHILS NFR BLD: 81.4 % (ref 38–73)
NRBC BLD-RTO: 0 /100 WBC
PLATELET # BLD AUTO: 219 K/UL (ref 150–450)
PMV BLD AUTO: 10.1 FL (ref 9.2–12.9)
POTASSIUM SERPL-SCNC: 4.6 MMOL/L (ref 3.5–5.1)
PROT SERPL-MCNC: 7.8 G/DL (ref 6–8.4)
RBC # BLD AUTO: 4.37 M/UL (ref 4–5.4)
SODIUM SERPL-SCNC: 138 MMOL/L (ref 136–145)
TSH SERPL DL<=0.005 MIU/L-ACNC: 1.1 UIU/ML (ref 0.4–4)
UUN UR-MCNC: 14 MG/DL (ref 7–17)
VIT B12 SERPL-MCNC: 180 PG/ML (ref 210–950)
WBC # BLD AUTO: 9.43 K/UL (ref 3.9–12.7)

## 2022-10-18 PROCEDURE — 1159F MED LIST DOCD IN RCRD: CPT | Mod: CPTII,95,, | Performed by: STUDENT IN AN ORGANIZED HEALTH CARE EDUCATION/TRAINING PROGRAM

## 2022-10-18 PROCEDURE — 85025 COMPLETE CBC W/AUTO DIFF WBC: CPT | Mod: PO | Performed by: STUDENT IN AN ORGANIZED HEALTH CARE EDUCATION/TRAINING PROGRAM

## 2022-10-18 PROCEDURE — 83735 ASSAY OF MAGNESIUM: CPT | Mod: PO | Performed by: STUDENT IN AN ORGANIZED HEALTH CARE EDUCATION/TRAINING PROGRAM

## 2022-10-18 PROCEDURE — 1159F PR MEDICATION LIST DOCUMENTED IN MEDICAL RECORD: ICD-10-PCS | Mod: CPTII,95,, | Performed by: STUDENT IN AN ORGANIZED HEALTH CARE EDUCATION/TRAINING PROGRAM

## 2022-10-18 PROCEDURE — 82746 ASSAY OF FOLIC ACID SERUM: CPT | Mod: PO | Performed by: STUDENT IN AN ORGANIZED HEALTH CARE EDUCATION/TRAINING PROGRAM

## 2022-10-18 PROCEDURE — 84443 ASSAY THYROID STIM HORMONE: CPT | Mod: PO | Performed by: STUDENT IN AN ORGANIZED HEALTH CARE EDUCATION/TRAINING PROGRAM

## 2022-10-18 PROCEDURE — 3044F PR MOST RECENT HEMOGLOBIN A1C LEVEL <7.0%: ICD-10-PCS | Mod: CPTII,95,, | Performed by: STUDENT IN AN ORGANIZED HEALTH CARE EDUCATION/TRAINING PROGRAM

## 2022-10-18 PROCEDURE — 36415 COLL VENOUS BLD VENIPUNCTURE: CPT | Mod: PO | Performed by: STUDENT IN AN ORGANIZED HEALTH CARE EDUCATION/TRAINING PROGRAM

## 2022-10-18 PROCEDURE — 1160F RVW MEDS BY RX/DR IN RCRD: CPT | Mod: CPTII,95,, | Performed by: STUDENT IN AN ORGANIZED HEALTH CARE EDUCATION/TRAINING PROGRAM

## 2022-10-18 PROCEDURE — 99213 OFFICE O/P EST LOW 20 MIN: CPT | Mod: 95,,, | Performed by: STUDENT IN AN ORGANIZED HEALTH CARE EDUCATION/TRAINING PROGRAM

## 2022-10-18 PROCEDURE — 1160F PR REVIEW ALL MEDS BY PRESCRIBER/CLIN PHARMACIST DOCUMENTED: ICD-10-PCS | Mod: CPTII,95,, | Performed by: STUDENT IN AN ORGANIZED HEALTH CARE EDUCATION/TRAINING PROGRAM

## 2022-10-18 PROCEDURE — 4010F PR ACE/ARB THEARPY RXD/TAKEN: ICD-10-PCS | Mod: CPTII,95,, | Performed by: STUDENT IN AN ORGANIZED HEALTH CARE EDUCATION/TRAINING PROGRAM

## 2022-10-18 PROCEDURE — 80053 COMPREHEN METABOLIC PANEL: CPT | Mod: PO | Performed by: STUDENT IN AN ORGANIZED HEALTH CARE EDUCATION/TRAINING PROGRAM

## 2022-10-18 PROCEDURE — 82607 VITAMIN B-12: CPT | Mod: PO | Performed by: STUDENT IN AN ORGANIZED HEALTH CARE EDUCATION/TRAINING PROGRAM

## 2022-10-18 PROCEDURE — 3044F HG A1C LEVEL LT 7.0%: CPT | Mod: CPTII,95,, | Performed by: STUDENT IN AN ORGANIZED HEALTH CARE EDUCATION/TRAINING PROGRAM

## 2022-10-18 PROCEDURE — 99213 PR OFFICE/OUTPT VISIT, EST, LEVL III, 20-29 MIN: ICD-10-PCS | Mod: 95,,, | Performed by: STUDENT IN AN ORGANIZED HEALTH CARE EDUCATION/TRAINING PROGRAM

## 2022-10-18 PROCEDURE — 4010F ACE/ARB THERAPY RXD/TAKEN: CPT | Mod: CPTII,95,, | Performed by: STUDENT IN AN ORGANIZED HEALTH CARE EDUCATION/TRAINING PROGRAM

## 2022-10-18 RX ORDER — BUTALBITAL, ACETAMINOPHEN AND CAFFEINE 50; 325; 40 MG/1; MG/1; MG/1
1 TABLET ORAL EVERY 6 HOURS PRN
Qty: 45 TABLET | Refills: 1 | Status: SHIPPED | OUTPATIENT
Start: 2022-10-18 | End: 2023-07-28 | Stop reason: SDUPTHER

## 2022-10-18 NOTE — TELEPHONE ENCOUNTER
Pharmacy closed and will reopen at 2    ----- Message from Alejandra Freeman sent at 10/18/2022 11:06 AM CDT -----  Regarding: [facility]PHARM[facility]Y  Contact: [facility]/PHARMACY/[phone]  Type:  Pharmacy Calling to Clarify an RX    Name of Caller:   Pharmacy Name:  [facility]/PHARMACY #[id number] - [place], [place] - [address] AT [facility];  Prescription Name: butalbital-acetaminophen-caffeine -40 mg (FIORICET, ESGIC) -40 mg per tablet  What do they need to clarify?: needs a verbal with a RADHA number   Best Call Back Number: [facility]/PHARMACY/[phone]  Additional Information:

## 2022-10-18 NOTE — PROGRESS NOTES
Primary Care Virtual Visit    The patient location is: [place], [place]  The chief complaint leading to consultation is:  headache  Visit type: Virtual visit with synchronous audio and video  Total time spent with patient:  7 minutes  Each patient to whom he or she provides medical services by telemedicine is:  (1) informed of the relationship between the physician and patient and the respective role of any other health care provider with respect to management of the patient; and (2) notified that he or she may decline to receive medical services by telemedicine and may withdraw from such care at any time.     Patient ID: Antonieta Enriquez is a 52 y.o. female.       Hypertension  This is a recurrent problem. The current episode started more than 1 year ago. The problem is unchanged. The problem is controlled. Associated symptoms include blurred vision, headaches, malaise/fatigue and sweats. Pertinent negatives include no anxiety, chest pain, neck pain, orthopnea, palpitations, peripheral edema, PND or shortness of breath. Risk factors for coronary artery disease include obesity.   Headache   This is a new problem. Episode onset: 3 weeks ago. The problem occurs daily. The problem has been unchanged. The pain is located in the Bilateral and frontal region. The pain does not radiate. The pain quality is not similar to prior headaches. The quality of the pain is described as band-like and aching. The pain is at a severity of 5/10. The pain is moderate. Associated symptoms include blurred vision and sinus pressure. Pertinent negatives include no back pain, coughing, dizziness, ear pain, eye pain, eye redness, eye watering, facial sweating, fever, loss of balance, muscle aches, nausea, neck pain, phonophobia, photophobia, seizures, sore throat, swollen glands, vomiting or weakness. She has tried acetaminophen and NSAIDs for the symptoms. The treatment provided no relief.      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 Negative for fever.   HENT:  Positive for sinus pressure. Negative for ear pain, sinus pain and sore throat.    Eyes:  Positive for blurred vision. Negative for photophobia, pain, discharge and redness.   Respiratory:  Negative for cough and shortness of breath.    Cardiovascular:  Negative for chest pain, palpitations, orthopnea, leg swelling and PND.   Gastrointestinal:  Negative for diarrhea, nausea and vomiting.   Genitourinary:  Negative for urgency.   Musculoskeletal:  Negative for back pain, myalgias and neck pain.   Skin:  Negative for rash.   Neurological:  Positive for headaches. Negative for dizziness, seizures, weakness and loss of balance.   Psychiatric/Behavioral:  Negative for depression.    All other systems reviewed and are negative.    Currently Medications  Current Outpatient Medications on File Prior to Visit   Medication Sig Dispense Refill    cyanocobalamin 1,000 mcg/mL injection Inject 1,000 mcg into the muscle.      LIDOcaine (LIDODERM) 5 % Place 1 patch onto the skin once daily. Remove & Discard patch within 12 hours or as directed by MD 15 patch 0    lisinopriL (PRINIVIL,ZESTRIL) 5 MG tablet Take 1 tablet (5 mg total) by mouth once daily. 90 tablet 4    metoprolol succinate (TOPROL-XL) 50 MG 24 hr tablet Take 50 mg by mouth once.      ondansetron (ZOFRAN) 4 MG tablet Take 1 tablet (4 mg total) by mouth every 6 (six) hours as needed for Nausea. 30 tablet 0     No current facility-administered medications on file prior to visit.       Physical  Exam  There were no vitals filed for this visit.   Physical Exam  Constitutional:       Appearance: Normal appearance.   HENT:      Head: Normocephalic and atraumatic.   Eyes:      Extraocular Movements: Extraocular movements intact.   Skin:     Coloration: Skin is not pale.   Neurological:      Mental Status: She is alert and oriented to person,  place, and time.   Psychiatric:         Mood and Affect: Mood normal.       Labs:    Complete Blood Count  Lab Results   Component Value Date    RBC 4.63 05/30/2022    HGB 11.9 (L) 05/30/2022    HCT 38.4 05/30/2022    MCV 83 05/30/2022    MCH 25.7 (L) 05/30/2022    MCHC 31.0 (L) 05/30/2022    RDW 15.0 (H) 05/30/2022     05/30/2022    MPV 11.0 05/30/2022    GRAN 2.2 05/30/2022    GRAN 47.5 05/30/2022    LYMPH 2.0 05/30/2022    LYMPH 42.6 05/30/2022    MONO 0.4 05/30/2022    MONO 8.0 05/30/2022    EOS 0.1 05/30/2022    BASO 0.02 05/30/2022    EOSINOPHIL 1.5 05/30/2022    BASOPHIL 0.4 05/30/2022    DIFFMETHOD Automated 05/30/2022       Comprehensive Metabolic Panel  Lab Results   Component Value Date    GLU 83 05/30/2022    BUN 15 05/30/2022    CREATININE 1.00 05/30/2022     05/30/2022    K 4.7 05/30/2022     05/30/2022    PROT 6.6 05/30/2022    ALBUMIN 3.8 05/30/2022    BILITOT 0.6 05/30/2022    AST 24 05/30/2022    ALKPHOS 102 05/30/2022    CO2 28 05/30/2022    ALT 20 05/30/2022    ANIONGAP 7 (L) 05/30/2022    EGFRNONAA >60.0 05/30/2022    ESTGFRAFRICA >60.0 05/30/2022       TSH  Lab Results   Component Value Date    TSH 2.180 05/30/2022       Imaging:  US Breast Left Limited  Result:   US Breast Left Limited     History:  Patient is 52 y.o. and is seen for diagnostic imaging.    Films Compared:  Prior images (if available) were compared.     FINDINGS:   Targeted ultrasound of the 2 o'clock position of the left breast, 11 cm   from the nipple, reveals the slightly lobular oblong hypoechoic soft   tissue mass, well-defined with posterior acoustic shadowing measuring 1.2   x 0.9 x 0.8 cm.  No other solid or cystic masses are identified.     Evaluation of the axillary region fails to reveal evidence for solid or   cystic masses or abnormal lymph nodes.     IMPRESSION:   1.  1.2 x 0.9 x 0.8 cm round hypoechoic solid nodule with posterior   acoustic shadowing.  This is viewed with suspicion.  Biopsy  recommended.     2.  The results of this ultrasound was discussed with the patient at 11:55   hours on October 7, 2022.  The patient has had prior mammograms performed   at an outside state ([place]).  The patient will attempt to obtain those   mammogram images.  If this lesion has not changed from multiple years,   biopsy may not be necessary.  However, if the mammograms cannot be   obtained, continuing with biopsy is recommended.     BI-RADS Category:   Overall: 4 - Suspicious       Recommendation:  Ultrasound-guided biopsy is recommended.    Your estimated lifetime risk of breast cancer (to age 85) based on   Tyrer-Cuzick risk assessment model is Tyrer-Cuzick: 9.56 %. According to   the American Cancer Society, patients with a lifetime breast cancer risk   of 20% or higher might benefit from supplemental screening tests.      Assessment/Plan:    Problem List Items Addressed This Visit    None  Visit Diagnoses       Acute nonintractable headache, unspecified headache type    -  Primary    Relevant Medications    butalbital-acetaminophen-caffeine -40 mg (FIORICET, ESGIC) -40 mg per tablet    Other Relevant Orders    CBC Auto Differential    Comprehensive metabolic panel    TSH    MAGNESIUM    Folate    Vitamin B12          Labs this week    Claritin or allegra daily for the next week.     Fiorcet prescribed,    RTC 1 week If headache does not improve    Rebel Kimball MD

## 2022-10-20 ENCOUNTER — PATIENT MESSAGE (OUTPATIENT)
Dept: FAMILY MEDICINE | Facility: CLINIC | Age: 52
End: 2022-10-20
Payer: COMMERCIAL

## 2022-10-20 RX ORDER — PNV NO.95/FERROUS FUM/FOLIC AC 28MG-0.8MG
100 TABLET ORAL DAILY
Qty: 90 TABLET | Refills: 3 | Status: SHIPPED | OUTPATIENT
Start: 2022-10-20 | End: 2023-10-07 | Stop reason: SDUPTHER

## 2022-10-23 PROBLEM — E53.8 B12 DEFICIENCY: Status: ACTIVE | Noted: 2022-10-23

## 2022-10-25 ENCOUNTER — TELEPHONE (OUTPATIENT)
Dept: FAMILY MEDICINE | Facility: CLINIC | Age: 52
End: 2022-10-25
Payer: COMMERCIAL

## 2022-11-01 RX ORDER — METOPROLOL SUCCINATE 50 MG/1
50 TABLET, EXTENDED RELEASE ORAL ONCE
Qty: 90 TABLET | Refills: 1 | Status: SHIPPED | OUTPATIENT
Start: 2022-11-01 | End: 2023-05-28

## 2022-11-01 NOTE — TELEPHONE ENCOUNTER
----- Message from Frances Nails sent at 11/1/2022  1:16 PM CDT -----  Type:  Needs Medical Advice    Who Called: self  Reason:refill on metoprolol succinate (TOPROL-XL) 50 MG 24 hr tablet. Patient is completely out   Would the patient rather a call back or a response via [facility]? call  Best Call Back Number: [facility]/pharmacy #[id number] - [address] AT [facility]   Phone:  [phone]  Fax:  [phone]        Additional Information: denisse

## 2022-11-01 NOTE — TELEPHONE ENCOUNTER
No new care gaps identified.  [facility] Embedded Care Gaps. Reference number: [id number]. 11/01/2022   1:44:59 PM CDT

## 2023-02-17 ENCOUNTER — TELEPHONE (OUTPATIENT)
Dept: FAMILY MEDICINE | Facility: CLINIC | Age: 53
End: 2023-02-17
Payer: COMMERCIAL

## 2023-02-17 ENCOUNTER — PATIENT MESSAGE (OUTPATIENT)
Dept: FAMILY MEDICINE | Facility: CLINIC | Age: 53
End: 2023-02-17
Payer: COMMERCIAL

## 2023-02-17 NOTE — TELEPHONE ENCOUNTER
Informed pt that once MD completes paperwork pt will be notified to     ----- Message from Philip Aldana sent at 2/17/2023  2:27 PM CST -----  Contact: pt  .Type:  Needs Medical Advice    Who Called: pt  Would the patient rather a call back or a response via [facility]?  Call back  Best Call Back Number: [phone]  Additional Information:  Pt. Is calling regarding paperwork she drop regarding respiratory clearance

## 2023-05-25 ENCOUNTER — TELEPHONE (OUTPATIENT)
Dept: FAMILY MEDICINE | Facility: CLINIC | Age: 53
End: 2023-05-25
Payer: COMMERCIAL

## 2023-05-25 DIAGNOSIS — I10 PRIMARY HYPERTENSION: ICD-10-CM

## 2023-05-25 DIAGNOSIS — I42.2 HYPERTROPHIC CARDIOMYOPATHY: ICD-10-CM

## 2023-05-25 NOTE — TELEPHONE ENCOUNTER
----- Message from Alejandra Freeman sent at 5/25/2023  8:26 AM CDT -----  Regarding: same day  Contact: [phone]  Type:  Same Day Appointment Request    Caller is requesting a same day appointment.  Caller declined first available appointment listed below.    Name of Caller: self   When is the first available appointment?   Symptoms: headache, bloodshot eye, irritated stomach  Best Call Back Number: [phone]   Additional Information:

## 2023-05-25 NOTE — TELEPHONE ENCOUNTER
Spoke with pt she stated that on Tuesday she stated with a headache and bloodshot eye and nausea pt would like to know what she should do

## 2023-05-25 NOTE — TELEPHONE ENCOUNTER
Betsy Luque Staff  Caller: Unspecified (Today, 10:14 AM)  Type:  Patient Returning Call     Who Called: pt   Who Left Message for Patient: BOBLEONOR   Does the patient know what this is regarding?: WORKED IN TODAY   Would the patient rather a call back or a response via [facility]?   Best Call Back Number:[phone] (M)   Additional Information:    Physical Therapy  Visit Type: initial evaluation  Precautions:  Medical precautions:  fall risk;. Bilateral total knee arthroplasty with DePuy Sigma cruciate retaining cruciate retaining size 4 femoral and 4 tibial components, size 4 10mm curved plus tibial tray and 38mm domed polyethylene patella.  Lines:     Basic: IV  Hearing: no hearing deficits  Vision:     Current vision: no visual deficits  Safety Measures: bed alarm      SUBJECTIVE                                                                                                            Patient agreed to participate in therapy this date.  Patient verbally agrees to allow the following to be present during session: spouse  I thought I could get up but my legs just won't hold me.  Patient / Family Goal: maximize function and return home  Pain   RN informed on pain level      OBJECTIVE                                                                                                                Oriented to person, place, time and situation   Patient activity tolerance: 1 to 2 activity to rest and 1 to 1 activity to rest  Functional Communication/Cognition    Overall status:  Within functional limits  Incision/Wound:     Location: BLE in ACE wrap, covered by dressing    Range of Motion (measured in degrees unless otherwise noted, active unless indicated)  Knee:   - Flexion (150):      • Left:  70      • Right:  85   - Extension (0-10):      • Left:  6      • Right:  6  Strength (out of 5 unless otherwise indicated)   Hip:  Hip Flexion: Left: 3 Right: 3   - Abduction:      • Left: 3+      • Right: 3+   - Adduction:      • Left: 3+      • Right: 3+  Knee:   - Flexion:      • Left: 3+      • Right: 3+   - Extension:      • Left: 3-      • Right: 3-  Ankle:    - Dorsiflexion:      • Left: 4      • Right: 4   - Plantar Flexion:      • Left: 4      • Right: 4  Balance    Sitting: Static: independent, Dynamic: independent  Bed Mobility:    Rolling left: independent and  set up    Rolling right: independent and set up    Repositioning in bed: independent and set up    Supine to sit: set up, contact guard/touching/steadying assist and minimal assist    Sit to supine: minimal assist  Training completed:    Tasks: all aspects of bed mobility    Education details: body mechanics and patient safety  Transfers:    Assistive devices: 2-wheeled walker, 1 person, 2 person and gait belt    Sit to stand: maximal assist (knee buckled and unable to safely complete getting to standing with increased knee pain)    Stand to sit: maximal assist  Training completed:      Education details: body mechanics, patient safety and patient requires additional training  Gait/Ambulation:     Assistance: not attempted due to safety concerns (unable to stand and walk)        Interventions                                                                                                       Supine    Lower Extremity: Bilateral: heel slides, quad sets, gluteus sets, ankle pumps and hip abduction/adduction, 10 reps, 1 sets  Seated    Lower Extremity: Bilateral: toe raises, heel raises and knee extensions, 10 reps, 1 sets  Skilled input: Verbal instruction/cues  Verbal Consent: Writer verbally educated and received verbal consent for hand placement, positioning of patient, and techniques to be performed today from patient for therapist position for techniques and hand placement and palpation for techniques as described above and how they are pertinent to the patient's plan of care.        ASSESSMENT                                                                                                                Impairments: range of motion, strength, pain and body habitus  Functional Limitations: all functional mobility  51 year old female patient seen in  nursing unit.  Patient presents significantly below baseline which was independent  with mobility.    For safe return to prior living situation the patient needs to  be at a modified independent  level for mobility.      Patient is currently functioning at maximal assist  for mobility.     Collaborated with RN and CNA.           Discharge Recommendations  Recommendation for Discharge: PT WI: Home therapy, 24 Hour assist             PT/OT Mobility Equipment for Discharge: has own 2WW in room      PT Identified Barriers to Discharge: medical status   Skilled therapy is required to address these limitations in attempt to maximize the patient's independence.  Progress: slow progress, medical status limitations    End of Session:   Location: in bed  Safety measures: alarm system in place/re-engaged  Handoff to: nurse    PLAN                                                                                                                            Suggestions for next session as indicated: Therapeutic exercises. Ex, transfers and gait trng    PT Frequency: Once a day, Twice a day, 5 days/week    Interventions: balance, bed mobility, body mechanics, compensatory technique education, gait training, neuromuscular re-education, ROM, strengthening, safety education, patient/family training, HEP train/position, equipment eval/education, continued evaluation, community reintegration, functional transfer training and stairs retraining  Agreement to plan and goals: patient agrees with goals and treatment plan and family/significant other/caregiver agrees        GOALS:  Long Term Goals: (to be met by time of discharge from hospital)  Sit to supine: Patient will complete sit to supine independent.  Supine to sit: Patient will complete supine to sit independent.  Sit to stand: Patient will complete sit to stand transfer with modified independent.   Stand to sit: Patient will complete stand to sit transfer with modified independent.   Stand pivot: Patient will complete stand pivot transfer with modified independent.   Ambulation (even): Patient will ambulate on even surface for 150 feet with  2-wheeled walker, modified independent.       Documented in the chart in the following areas: Assessment.

## 2023-05-25 NOTE — TELEPHONE ENCOUNTER
----- Message from Ernesto Hall sent at 5/25/2023  3:46 PM CDT -----  Contact: pt  Type:  update on BP    Who Called: pt   Would the patient rather a call back or a response via [facility]? call  Best Call Back Number: [phone]  Additional Information:       /88

## 2023-05-27 NOTE — TELEPHONE ENCOUNTER
No care due was identified.  [facility] Embedded Care Due Messages. Reference number: [id number].   5/27/2023 7:13:11 AM CDT

## 2023-05-28 VITALS — DIASTOLIC BLOOD PRESSURE: 88 MMHG | SYSTOLIC BLOOD PRESSURE: 137 MMHG

## 2023-05-28 RX ORDER — METOPROLOL SUCCINATE 50 MG/1
TABLET, EXTENDED RELEASE ORAL
Qty: 90 TABLET | Refills: 1 | Status: SHIPPED | OUTPATIENT
Start: 2023-05-28 | End: 2023-11-20

## 2023-05-28 RX ORDER — LISINOPRIL 10 MG/1
10 TABLET ORAL DAILY
Qty: 90 TABLET | Refills: 3 | Status: SHIPPED | OUTPATIENT
Start: 2023-05-28

## 2023-05-28 NOTE — TELEPHONE ENCOUNTER
Refill Routing Note   Medication(s) are not appropriate for processing by [facility] for the following reason(s):      Required vitals abnormal    [facility] action(s):  Defer None identified            Appointments  past 12m or future 3m with PCP    Date Provider   Last Visit   10/18/2022 Rebel Kimball MD   Next Visit   Visit date not found Rebel Kimball MD   ED visits in past 90 days: 0        Note composed:2:55 AM 05/28/2023

## 2023-05-29 ENCOUNTER — TELEPHONE (OUTPATIENT)
Dept: FAMILY MEDICINE | Facility: CLINIC | Age: 53
End: 2023-05-29
Payer: COMMERCIAL

## 2023-05-29 NOTE — TELEPHONE ENCOUNTER
----- Message from Alejandra Freeman sent at 5/29/2023  2:38 PM CDT -----  Regarding: returning a call  Contact: [phone] (  Type:  Patient Returning Call    Who Called: self   Who Left Message for Patient: Meena Gregorio MA   Does the patient know what this is regarding?: BP message   Would the patient rather a call back or a response via [facility]?  Call   Best Call Back Number: [phone]   Additional Information:

## 2023-05-29 NOTE — TELEPHONE ENCOUNTER
Left message for pt to call back; Need to give update on BP med.         Note      Luisa Calhoun MA  Antonieta Enriquez 13 minutes ago (3:55 PM)     Luisa Calhoun MA 13 minutes ago (3:54 PM)     KB  ----- Message from Alejandra Freeman sent at 5/29/2023  2:38 PM CDT -----  Regarding: returning a call  Contact: [phone] (  Type:  Patient Returning Call     Who Called: self   Who Left Message for Patient: Meena Gregorio MA   Does the patient know what this is regarding?: BP message   Would the patient rather a call back or a response via [facility]?  Call   Best Call Back Number: [phone]   Additional Information:

## 2023-05-31 NOTE — TELEPHONE ENCOUNTER
Pt inform that per  Lisinopril was increse to 10 mg qd.Pt agrees abd verbalize and states she has already  med from RX.

## 2023-07-28 ENCOUNTER — TELEPHONE (OUTPATIENT)
Dept: FAMILY MEDICINE | Facility: CLINIC | Age: 53
End: 2023-07-28
Payer: COMMERCIAL

## 2023-07-28 ENCOUNTER — HOSPITAL ENCOUNTER (EMERGENCY)
Facility: HOSPITAL | Age: 53
Discharge: HOME | End: 2023-07-28
Attending: STUDENT IN AN ORGANIZED HEALTH CARE EDUCATION/TRAINING PROGRAM
Payer: COMMERCIAL

## 2023-07-28 VITALS
WEIGHT: 198 LBS | HEART RATE: 62 BPM | SYSTOLIC BLOOD PRESSURE: 180 MMHG | OXYGEN SATURATION: 98 % | HEIGHT: 62 IN | TEMPERATURE: 99 F | DIASTOLIC BLOOD PRESSURE: 100 MMHG | BODY MASS INDEX: 36.44 KG/M2 | RESPIRATION RATE: 18 BRPM

## 2023-07-28 DIAGNOSIS — R51.9 ACUTE NONINTRACTABLE HEADACHE, UNSPECIFIED HEADACHE TYPE: ICD-10-CM

## 2023-07-28 DIAGNOSIS — G43.809 OTHER MIGRAINE WITHOUT STATUS MIGRAINOSUS, NOT INTRACTABLE: Primary | ICD-10-CM

## 2023-07-28 LAB
ALBUMIN SERPL BCP-MCNC: 3.6 G/DL (ref 3.5–5.2)
ALP SERPL-CCNC: 99 U/L (ref 38–126)
ALT SERPL W/O P-5'-P-CCNC: 21 U/L (ref 10–44)
ANION GAP SERPL CALC-SCNC: 10 MMOL/L (ref 8–16)
AST SERPL-CCNC: 29 U/L (ref 15–46)
BASOPHILS # BLD AUTO: 0.03 K/UL (ref 0–0.2)
BASOPHILS NFR BLD: 0.5 % (ref 0–1.9)
BILIRUB SERPL-MCNC: 0.4 MG/DL (ref 0.1–1)
CALCIUM SERPL-MCNC: 8.8 MG/DL (ref 8.7–10.5)
CHLORIDE SERPL-SCNC: 109 MMOL/L (ref 95–110)
CO2 SERPL-SCNC: 21 MMOL/L (ref 23–29)
CREAT SERPL-MCNC: 1.18 MG/DL (ref 0.5–1.4)
DIFFERENTIAL METHOD: ABNORMAL
EOSINOPHIL # BLD AUTO: 0.1 K/UL (ref 0–0.5)
EOSINOPHIL NFR BLD: 1.4 % (ref 0–8)
ERYTHROCYTE [DISTWIDTH] IN BLOOD BY AUTOMATED COUNT: 15 % (ref 11.5–14.5)
EST. GFR  (NO RACE VARIABLE): 54.5 ML/MIN/1.73 M^2
GLUCOSE SERPL-MCNC: 145 MG/DL (ref 70–110)
HCT VFR BLD AUTO: 36.4 % (ref 37–48.5)
HGB BLD-MCNC: 11.6 G/DL (ref 12–16)
IMM GRANULOCYTES # BLD AUTO: 0.02 K/UL (ref 0–0.04)
IMM GRANULOCYTES NFR BLD AUTO: 0.3 % (ref 0–0.5)
LYMPHOCYTES # BLD AUTO: 2 K/UL (ref 1–4.8)
LYMPHOCYTES NFR BLD: 30.3 % (ref 18–48)
MCH RBC QN AUTO: 25.8 PG (ref 27–31)
MCHC RBC AUTO-ENTMCNC: 31.9 G/DL (ref 32–36)
MCV RBC AUTO: 81 FL (ref 82–98)
MONOCYTES # BLD AUTO: 0.5 K/UL (ref 0.3–1)
MONOCYTES NFR BLD: 8 % (ref 4–15)
NEUTROPHILS # BLD AUTO: 4 K/UL (ref 1.8–7.7)
NEUTROPHILS NFR BLD: 59.5 % (ref 38–73)
NRBC BLD-RTO: 0 /100 WBC
PLATELET # BLD AUTO: 208 K/UL (ref 150–450)
PMV BLD AUTO: 10.4 FL (ref 9.2–12.9)
POTASSIUM SERPL-SCNC: 3.8 MMOL/L (ref 3.5–5.1)
PROT SERPL-MCNC: 6.6 G/DL (ref 6–8.4)
RBC # BLD AUTO: 4.49 M/UL (ref 4–5.4)
SODIUM SERPL-SCNC: 140 MMOL/L (ref 136–145)
UUN UR-MCNC: 12 MG/DL (ref 7–17)
WBC # BLD AUTO: 6.63 K/UL (ref 3.9–12.7)

## 2023-07-28 PROCEDURE — 99285 EMERGENCY DEPT VISIT HI MDM: CPT | Mod: 25,ER

## 2023-07-28 PROCEDURE — 96375 TX/PRO/DX INJ NEW DRUG ADDON: CPT | Mod: ER

## 2023-07-28 PROCEDURE — 80053 COMPREHEN METABOLIC PANEL: CPT | Mod: ER | Performed by: STUDENT IN AN ORGANIZED HEALTH CARE EDUCATION/TRAINING PROGRAM

## 2023-07-28 PROCEDURE — 63600175 PHARM REV CODE 636 W HCPCS: Mod: ER | Performed by: STUDENT IN AN ORGANIZED HEALTH CARE EDUCATION/TRAINING PROGRAM

## 2023-07-28 PROCEDURE — 96374 THER/PROPH/DIAG INJ IV PUSH: CPT | Mod: ER

## 2023-07-28 PROCEDURE — 96361 HYDRATE IV INFUSION ADD-ON: CPT | Mod: ER

## 2023-07-28 PROCEDURE — 85025 COMPLETE CBC W/AUTO DIFF WBC: CPT | Mod: ER | Performed by: STUDENT IN AN ORGANIZED HEALTH CARE EDUCATION/TRAINING PROGRAM

## 2023-07-28 PROCEDURE — 25000003 PHARM REV CODE 250: Mod: ER | Performed by: STUDENT IN AN ORGANIZED HEALTH CARE EDUCATION/TRAINING PROGRAM

## 2023-07-28 RX ORDER — DIPHENHYDRAMINE HYDROCHLORIDE 50 MG/ML
25 INJECTION INTRAMUSCULAR; INTRAVENOUS
Status: COMPLETED | OUTPATIENT
Start: 2023-07-28 | End: 2023-07-28

## 2023-07-28 RX ORDER — KETOROLAC TROMETHAMINE 30 MG/ML
15 INJECTION, SOLUTION INTRAMUSCULAR; INTRAVENOUS
Status: COMPLETED | OUTPATIENT
Start: 2023-07-28 | End: 2023-07-28

## 2023-07-28 RX ORDER — PROCHLORPERAZINE EDISYLATE 5 MG/ML
10 INJECTION INTRAMUSCULAR; INTRAVENOUS ONCE
Status: COMPLETED | OUTPATIENT
Start: 2023-07-28 | End: 2023-07-28

## 2023-07-28 RX ORDER — BUTALBITAL, ACETAMINOPHEN AND CAFFEINE 50; 325; 40 MG/1; MG/1; MG/1
1 TABLET ORAL EVERY 6 HOURS PRN
Qty: 45 TABLET | Refills: 1 | Status: SHIPPED | OUTPATIENT
Start: 2023-07-28 | End: 2023-07-31 | Stop reason: SDUPTHER

## 2023-07-28 RX ADMIN — SODIUM CHLORIDE 1000 ML: 9 INJECTION, SOLUTION INTRAVENOUS at 07:07

## 2023-07-28 RX ADMIN — DIPHENHYDRAMINE HYDROCHLORIDE 25 MG: 50 INJECTION INTRAMUSCULAR; INTRAVENOUS at 07:07

## 2023-07-28 RX ADMIN — PROCHLORPERAZINE EDISYLATE 10 MG: 5 INJECTION INTRAMUSCULAR; INTRAVENOUS at 07:07

## 2023-07-28 RX ADMIN — KETOROLAC TROMETHAMINE 15 MG: 30 INJECTION, SOLUTION INTRAMUSCULAR; INTRAVENOUS at 07:07

## 2023-07-28 NOTE — FIRST PROVIDER EVALUATION
Emergency Department TeleTriage Encounter Note      CHIEF COMPLAINT    Chief Complaint   Patient presents with    Headache     Headache x 5 days, states hx of migraines        VITAL SIGNS   Initial Vitals [07/28/23 1755]   BP Pulse Resp Temp SpO2   (!) 180/100 62 18 98.5 °F (36.9 °C) 98 %      MAP       --            ALLERGIES    Review of patient's allergies indicates:  No Known Allergies    PROVIDER TRIAGE NOTE  This is a teletriage evaluation of a 53 y.o. female presenting to the ED complaining of headache. Patient reports constant headache for 5 days. She has history of migraines but states this one is more debilitating. She denies nausea, vomiting, neck pain, fever, or vision changes.    Patient is alert and oriented. She speaks in complete sentences. She is sitting upright in the chair in no distress.     Initial orders will be placed and care will be transferred to an alternate provider when patient is roomed for a full evaluation. Any additional orders and the final disposition will be determined by that provider.         ORDERS  Labs Reviewed - No data to display    ED Orders (720h ago, onward)      Start Ordered     Status Ordering Provider    07/28/23 1806 07/28/23 1805  CT Head Without Contrast  1 time imaging         Ordered DARRELL MCKINNEY              Virtual Visit Note: The provider triage portion of this emergency department evaluation and documentation was performed via [employer], a HIPAA-compliant telemedicine application, in concert with a tele-presenter in the room. A face to face patient evaluation with one of my colleagues will occur once the patient is placed in an emergency department room.      DISCLAIMER: This note was prepared with [employer] voice recognition transcription software. Garbled syntax, mangled pronouns, and other bizarre constructions may be attributed to that software system.

## 2023-07-28 NOTE — TELEPHONE ENCOUNTER
"----- Message from Radha Castro sent at 7/28/2023  1:59 PM CDT -----  Regarding: Call back  "Type:  Patient Call Back    Who Called:PT    What is the reqeust in detail:Pt requesting call back on yesterday she had a bad headache she took her BP medication and now today she still has a lit headache and would like to know if the doctor can call in something for her. Please advise    Can the clinic reply by [facility]?no    Best Call Back Number:[phone]      Additional Information:            "

## 2023-07-29 NOTE — ED PROVIDER NOTES
"ED Provider Note - 2023    History     Chief Complaint   Patient presents with    Headache     Headache x 5 days, states hx of migraines        Antonieta Enriquez is a 53 y.o. year old female with past medical and surgical history as seen below, presenting with chief complaint of headache.  Similar in sensation to her typical migraine.  However normally Excedrin over-the-counter works to stop it when she gets migraines.  She has photophobia.  Headache is right-sided.  Ongoing for the past 5 days.  Did send messages to PCP and Fioricet was sent to pharmacy but patient's symptoms continued on for long enough that she was unable to get it filled and came to the emergency department for further evaluation.    Past Medical History:   Diagnosis Date    Hypertension     Hypertrophic cardiomyopathy     Migraine      Past Surgical History:   Procedure Laterality Date    BREAST BIOPSY Right      neg     SECTION      CHOLECYSTECTOMY      GASTRIC BYPASS N/A 2009    HYSTERECTOMY           Family History   Problem Relation Age of Onset    Arthritis Mother     Diabetes Mother     Hypertension Mother     Cancer Father     Diabetes Brother     Diabetes Brother      Social History     Tobacco Use    Smoking status: Never    Smokeless tobacco: Never   Substance Use Topics    Alcohol use: Yes    Drug use: Never       Review of patient's allergies indicates:  No Known Allergies    Review of Systems     A full Review of Systems (ROS) was performed and was negative unless otherwise stated in the HPI.      Physical Exam     Vitals:    23 1755   BP: (!) 180/100   Pulse: 62   Resp: 18   Temp: 98.5 °F (36.9 °C)   SpO2: 98%   Weight: 89.8 kg (198 lb)   Height: 5' 2" (1.575 m)        Physical Exam    Nursing note and vitals reviewed.  Constitutional: She appears well-developed and well-nourished. No distress.   HENT:   Head: Normocephalic and atraumatic.   Right Ear: External ear normal.   Left Ear: External ear normal. "   Nose: Nose normal.   Mouth/Throat: Oropharynx is clear and moist.   Eyes: Conjunctivae and EOM are normal. Pupils are equal, round, and reactive to light.   Neck: Neck supple.   Normal range of motion.  Cardiovascular:  Normal rate, regular rhythm, normal heart sounds and intact distal pulses.           Pulmonary/Chest: Breath sounds normal. No stridor. No respiratory distress. She has no wheezes. She has no rhonchi. She has no rales.   Abdominal: Abdomen is soft. Bowel sounds are normal. There is no abdominal tenderness.   Musculoskeletal:         General: No tenderness or edema. Normal range of motion.      Cervical back: Normal range of motion and neck supple.     Neurological: She is alert and oriented to person, place, and time. She has normal strength. No cranial nerve deficit or sensory deficit.   Skin: Skin is warm and dry. No rash noted.   Psychiatric: She has a normal mood and affect. Thought content normal.         Lab Results- Independently reviewed by myself      Labs Reviewed   CBC W/ AUTO DIFFERENTIAL - Abnormal; Notable for the following components:       Result Value    Hemoglobin 11.6 (*)     Hematocrit 36.4 (*)     MCV 81 (*)     MCH 25.8 (*)     MCHC 31.9 (*)     RDW 15.0 (*)     All other components within normal limits   COMPREHENSIVE METABOLIC PANEL - Abnormal; Notable for the following components:    CO2 21 (*)     Glucose 145 (*)     eGFR 54.5 (*)     All other components within normal limits           Imaging     Imaging Results              CT Head Without Contrast (Final result)  Result time 07/28/23 18:23:58      Final result by Laura Kern MD (07/28/23 18:23:58)                   Impression:      No acute or adverse finding      Electronically signed by: Laura Kern  Date:    07/28/2023  Time:    18:23               Narrative:    EXAMINATION:  CT HEAD WITHOUT CONTRAST    CLINICAL HISTORY:  Headache, new or worsening (Age >= 50y);    TECHNIQUE:  Low dose axial images were  obtained through the head.  Coronal and sagittal reformations were also performed. Contrast was not administered.    COMPARISON:  February 2016    FINDINGS:  No acute intracranial hemorrhage or mass effect.  Ventricle stable caliber.  No displaced skull fracture.  Visualized sinuses and mastoids well aerated                                              ED Course           Orders Placed This Encounter    CT Head Without Contrast    CBC Auto Differential    Comprehensive Metabolic Panel    Saline lock IV    sodium chloride 0.9% bolus 1,000 mL 1,000 mL    prochlorperazine injection Soln 10 mg    diphenhydrAMINE injection 25 mg    ketorolac injection 15 mg                      Medical Decision Making       The patient's list of active medical problems, social history, medications, and allergies as documented per RN staff has been reviewed.     Comorbidities taken into consideration for development of diagnosis and treatment plan include HTN and migraines.    Medical Decision Making:   History:   Old Medical Records: I decided to obtain old medical records.  Old Records Summarized: records from clinic visits and other records.  Clinical Tests:   Lab Tests: Ordered and Reviewed  Radiological Study: Ordered and Reviewed  ED Management:  This patient presents with a headache most consistent with benign headache from either tension type headache vs migraine. No headache red flags. Neurologic exam without evidence of meningismus, AMS, focal neurologic findings so doubt meningitis, encephalitis, stroke. Presentation not consistent with acute intracranial bleed to include SAH (lack of risk factors, headache history). No history of trauma so doubt ICH. Given history and physical exam, temporal arteritis unlikely, as is acute angle closure glaucoma. Doubt carotid artery dissection given no focal neuro deficits, no neck trauma or recent neck strain. Patient with no signs of increased intracranial pressure or weight loss, and  history and physical suggest more benign headache, so less likely mass effect in brain from tumor or abscess or idiopathic intracranial hypertension. Pain was controlled well in the ED. Advised to continue oral hydration at home and work on sleep hygeine. Close follow-up with PCP. Return to the ED for reevaluation should symptoms worsen, return, or change in character.              Clinical Impression       Follow-up Information       Follow up With Specialties Details Why Contact Info    Rebel Kimball MD Internal Medicine Schedule an appointment as soon as possible for a visit in 3 days For follow-up on today's visit. [address] [address]  [phone]      [facility] - Emergency Dept Emergency Medicine Go to  As needed, If symptoms worsen [address] [facility] [address] [phone]            Diagnosis    ICD-10-CM ICD-9-CM   1. Other migraine without status migrainosus, not intractable  G43.809 346.80     Disposition   ED Disposition Condition    Discharge Stable                  Champ Quinteros MD        07/29/2023          DISCLAIMER: This note was prepared with [employer] voice recognition transcription software. Garbled syntax, mangled pronouns, and other bizarre constructions may be attributed to that software system.       Champ Quinteros MD  07/29/23 7851

## 2023-07-31 DIAGNOSIS — R51.9 ACUTE NONINTRACTABLE HEADACHE, UNSPECIFIED HEADACHE TYPE: ICD-10-CM

## 2023-07-31 RX ORDER — BUTALBITAL, ACETAMINOPHEN AND CAFFEINE 50; 325; 40 MG/1; MG/1; MG/1
1 TABLET ORAL EVERY 6 HOURS PRN
Qty: 45 TABLET | Refills: 1 | Status: SHIPPED | OUTPATIENT
Start: 2023-07-31 | End: 2023-07-31 | Stop reason: SDUPTHER

## 2023-07-31 NOTE — TELEPHONE ENCOUNTER
----- Message from Lydia Vega sent at 7/31/2023  4:58 PM CDT -----  Type:  RX Refill Request    Who Called: pt  Refill or New Rx:refill  RX Name and Strength:butalbital-acetaminophen-caffeine -40 mg (FIORICET, ESGIC) -40 mg per tablet  Preferred Pharmacy with phone number:[facility] #[id number] - [place], [address] AT [facility]  Local or Mail Order:local  Ordering Provider:albin  Would the patient rather a call back or a response via [facility]? call  Best Call Back Number:[phone]  Additional Information: pt following up on refill from Friday, pharmacy is stating they don't have any prescriptions for her

## 2023-07-31 NOTE — TELEPHONE ENCOUNTER
No care due was identified.  [facility] Embedded Care Due Messages. Reference number: [id number].   7/31/2023 5:22:12 PM CDT

## 2023-07-31 NOTE — TELEPHONE ENCOUNTER
Care Due:                  Date            Visit Type   Department     Provider  --------------------------------------------------------------------------------                                ESTABLISHED                              PATIENT -    [facility]  Last Visit: 10-      [employer]      MEDICINE       Rebel Kimball  Next Visit: None Scheduled  None         None Found                                                            Last  Test          Frequency    Reason                     Performed    Due Date  --------------------------------------------------------------------------------    Office Visit  12 months..  lisinopriL...............  10-   10-    [facility] Embedded Care Due Messages. Reference number: [id number].   7/31/2023 10:38:35 AM CDT

## 2023-07-31 NOTE — TELEPHONE ENCOUNTER
----- Message from Daniel Flores sent at 7/31/2023 10:32 AM CDT -----  Type:  RX Refill Request    Who Called: pt  Refill or New Rx: new  RX Name and Strength: pt said she doesn't know the name but that it's something for a headache    [facility] [address] [facility]    Ordering Provider:  Reach pt via:  Best Call Back Number:  [phone]  Additional Information: pt says staff needs to resend the rx they sent before because the pharmacist said they didn't get the rx in

## 2023-08-01 DIAGNOSIS — I10 PRIMARY HYPERTENSION: ICD-10-CM

## 2023-08-01 DIAGNOSIS — I42.2 HYPERTROPHIC CARDIOMYOPATHY: ICD-10-CM

## 2023-08-01 RX ORDER — BUTALBITAL, ACETAMINOPHEN AND CAFFEINE 50; 325; 40 MG/1; MG/1; MG/1
1 TABLET ORAL EVERY 6 HOURS PRN
Qty: 45 TABLET | Refills: 1 | Status: SHIPPED | OUTPATIENT
Start: 2023-08-01 | End: 2023-08-01 | Stop reason: SDUPTHER

## 2023-08-01 RX ORDER — BUTALBITAL, ACETAMINOPHEN AND CAFFEINE 50; 325; 40 MG/1; MG/1; MG/1
1 TABLET ORAL EVERY 6 HOURS PRN
Qty: 45 TABLET | Refills: 1 | Status: SHIPPED | OUTPATIENT
Start: 2023-08-01

## 2023-08-01 NOTE — TELEPHONE ENCOUNTER
Spoke to Michelle with the pharmacy. She took the Rx for the Fioricet verbally.     Attempted pt. Left detailed message stating I had to verbally call the medication in and to reach out to [facility] when the medication is ready for .

## 2023-08-02 RX ORDER — LISINOPRIL 5 MG/1
5 TABLET ORAL
Qty: 90 TABLET | Refills: 4 | Status: SHIPPED | OUTPATIENT
Start: 2023-08-02 | End: 2024-02-07

## 2023-08-04 ENCOUNTER — TELEPHONE (OUTPATIENT)
Dept: FAMILY MEDICINE | Facility: CLINIC | Age: 53
End: 2023-08-04
Payer: COMMERCIAL

## 2023-08-04 NOTE — TELEPHONE ENCOUNTER
----- Message from Portia Dao sent at 8/4/2023  3:12 PM CDT -----  Type:  Needs Medical Advice    Who Called:  Pt    Pharmacy name and phone #:  [id number] - [address] AT [facility]   Phone:  [phone]  Would the patient rather a call back or a response via [facility]?  call  Best Call Back Number:  [phone]  Additional Information:  Pt would like a call back regarding her medication lisinopriL (PRINIVIL,ZESTRIL) 5 MG tablet [87490] change.  Pt sates pharm refilled 5 mg and if it is ok to take two pills of the 5 mg  since pharmacy did not refill for the 10mg.

## 2023-11-18 NOTE — TELEPHONE ENCOUNTER
Care Due:                  Date            Visit Type   Department     Provider  --------------------------------------------------------------------------------                                ESTABLISHED                              PATIENT -    [facility]  Last Visit: 10-      [facility]      MEDICINE       Rebel Kimball  Next Visit: None Scheduled  None         None Found                                                            Last  Test          Frequency    Reason                     Performed    Due Date  --------------------------------------------------------------------------------    Office Visit  15 months..  lisinopriL...............  10-   01-    [facility] Embedded Care Due Messages. Reference number: [id number].   11/18/2023 7:25:10 AM CST

## 2023-11-20 RX ORDER — METOPROLOL SUCCINATE 50 MG/1
50 TABLET, EXTENDED RELEASE ORAL
Qty: 90 TABLET | Refills: 1 | Status: SHIPPED | OUTPATIENT
Start: 2023-11-20

## 2023-11-27 ENCOUNTER — ON-DEMAND VIRTUAL (OUTPATIENT)
Dept: URGENT CARE | Facility: CLINIC | Age: 53
End: 2023-11-27
Payer: COMMERCIAL

## 2023-11-27 DIAGNOSIS — B34.9 VIRAL ILLNESS: Primary | ICD-10-CM

## 2023-11-27 PROCEDURE — 99202 PR OFFICE/OUTPT VISIT, NEW, LEVL II, 15-29 MIN: ICD-10-PCS | Mod: 95,,, | Performed by: NURSE PRACTITIONER

## 2023-11-27 PROCEDURE — 99202 OFFICE O/P NEW SF 15 MIN: CPT | Mod: 95,,, | Performed by: NURSE PRACTITIONER

## 2023-11-27 NOTE — PROGRESS NOTES
Subjective:      Patient ID: Antonieta Enriquez is a 53 y.o. female.    Vitals:  vitals were not taken for this visit.     Chief Complaint: Nasal Congestion, Diarrhea, and Generalized Body Aches      Visit Type: TELE AUDIOVISUAL    Present with the patient at the time of consultation: TELEMED PRESENT WITH PATIENT: None    Past Medical History:   Diagnosis Date    Hypertension     Hypertrophic cardiomyopathy     Migraine      Past Surgical History:   Procedure Laterality Date    BREAST BIOPSY Right      neg     SECTION      CHOLECYSTECTOMY      GASTRIC BYPASS N/A 2009    HYSTERECTOMY       Review of patient's allergies indicates:  No Known Allergies  Current Outpatient Medications on File Prior to Visit   Medication Sig Dispense Refill    butalbital-acetaminophen-caffeine -40 mg (FIORICET, ESGIC) -40 mg per tablet Take 1 tablet by mouth every 6 (six) hours as needed for Headaches. 45 tablet 1    cyanocobalamin (VITAMIN B-12) 100 MCG tablet Take 1 tablet (100 mcg total) by mouth once daily. 90 tablet 3    cyanocobalamin 1,000 mcg/mL injection Inject 1,000 mcg into the muscle.      LIDOcaine (LIDODERM) 5 % Place 1 patch onto the skin once daily. Remove & Discard patch within 12 hours or as directed by MD 15 patch 0    lisinopriL (PRINIVIL,ZESTRIL) 5 MG tablet TAKE 1 TABLET BY MOUTH EVERY DAY 90 tablet 4    lisinopriL 10 MG tablet Take 1 tablet (10 mg total) by mouth once daily. 90 tablet 3    metoprolol succinate (TOPROL-XL) 50 MG 24 hr tablet TAKE 1 TABLET BY MOUTH EVERY DAY 90 tablet 1    ondansetron (ZOFRAN) 4 MG tablet Take 1 tablet (4 mg total) by mouth every 6 (six) hours as needed for Nausea. 30 tablet 0     No current facility-administered medications on file prior to visit.     Family History   Problem Relation Age of Onset    Arthritis Mother     Diabetes Mother     Hypertension Mother     Cancer Father     Diabetes Brother     Diabetes Brother            Ohs Peq Odvv Intake     11/27/2023  9:44 AM CST - Filed by Patient   Describe your reason for todays visit Congested, body aches, diarrhea   What is your current physical address in the event of a medical emergency?    Are you able to take your vital signs? No   Please attach any relevant images or files          3 days of post-nasal drip, congestion, headache, fever and body aches. +sick contacts at work. Using OTC meds with little relief. COVID negative.    Diarrhea   Associated symptoms include chills, coughing, a fever (100.7), headaches and myalgias.       Constitution: Positive for chills and fever (100.7).   HENT:  Positive for congestion, postnasal drip and sore throat. Negative for ear pain and trouble swallowing.    Respiratory:  Positive for cough and sputum production. Negative for shortness of breath and wheezing.    Gastrointestinal:  Positive for diarrhea.   Musculoskeletal:  Positive for muscle ache.   Neurological:  Positive for headaches.        Objective:   The physical exam was conducted virtually.  Physical Exam   Constitutional: She is oriented to person, place, and time. She does not appear ill. No distress.   HENT:   Head: Normocephalic and atraumatic.   Nose: Nose normal.   Eyes: Extraocular movement intact   Pulmonary/Chest: Effort normal.   Abdominal: Normal appearance.   Musculoskeletal: Normal range of motion.         General: Normal range of motion.   Neurological: no focal deficit. She is alert and oriented to person, place, and time.   Psychiatric: Her behavior is normal. Mood normal.   Vitals reviewed.      Assessment:     1. Viral illness        Plan:   Consider further testing as discussed.  Patient encouraged to monitor symptoms closely and instructed to follow-up for new or worsening symptoms. Further, in-person, evaluation may be necessary for continued treatment. Please follow up with your primary care doctor or specialist as needed. Verbally discussed plan. Patient confirms understanding and is in  agreement with treatment and plan.     You must understand that you've received a Virtual Care evaluation only and that you may be released before all your medical problems are known or treated. You, the patient, will arrange for follow up care as instructed.      Viral illness        Patient Instructions   OVER THE COUNTER RECOMMENDATIONS/SUGGESTIONS (IF NO CONTRAINDICATIONS).     ·         Make sure to stay well hydrated.     ·         Use Nasal Saline to mechanically move any post nasal drip from your eustachian tube or from the back of your throat.     ·         Use warm saltwater gargles to ease your throat pain. Warm saltwater gargles as needed for sore throat-  1/2 tsp salt to 1 cup warm water, gargle as desired. Warm fluids tend to relieve a sore throat.     .         Throat lozenges, Chloraseptic spray or other over the counter treatments are ok to use as well. Use as directed.     ·         Use an antihistamine such as Claritin, Zyrtec or Allegra to dry you out.     ·         Use pseudoephedrine (behind the counter) to decongest. Pseudoephedrine  30 mg up to 240 mg /day. It can raise your blood pressure and give you palpitations.     ·         Use Mucinex (guaifenesin) to break up mucous up to 2400mg/day to loosen any mucous.     ·         The Mucinex DM pill has a cough suppressant that can be sedating. It can be used at night to stop the tickle at the back of your throat.     ·         You can use Mucinex D (it has guaifenesin and a high dose of pseudoephedrine) in the mornings to help decongest.     ·         Use Afrin (oxymetazoline) in each nare for no longer than 3 days, as it is addictive. It can also dry out your mucous membranes and cause elevated blood pressure. This is especially useful if you are flying.     ·         Use Flonase 1-2 sprays/nostril per day. It is a local acting steroid nasal spray, if you develop a bloody nose, stop using the medication immediately.     ·         Sometimes  Nyquil at night is beneficial to help you get some rest, however it is sedating, and it does have an antihistamine, and Tylenol.     ·         Honey is a natural cough suppressant that can be used.     ·         Tylenol up to 4,000 mg a day is safe for short periods and can be used for body aches, pain, and fever. However, in high doses and prolonged use it can cause liver irritation.     ·         Ibuprofen is a non-steroidal anti-inflammatory that can be used for body aches, pain, and fever. However, it can also cause stomach irritation if overused.     Recommendations for stomach:   . Try to stay hydrated (Water, Diluted fruit juices, Gatorade, Pedialyte, Popsicles) as best as you can.      . Stick to a bland diet. Avoid spicy, greasy, fatty foods or dairy products until symptoms improve then advance diet as tolerated.     . Pepto-Bismol, Imodium, or Metamucil over the counter as directed for diarrhea relief.     . Over the counter Probiotics, as directed, may be beneficial.      Viral Upper Respiratory Infection Discharge Instructions, Adult   About this topic   You have an upper respiratory infection or URI. A URI can affect your nose, throat, ears, and sinuses. A virus is the cause of almost all URIs and antibiotics will not help you feel better more quickly. The common cold is an example of a viral URI.  URIs are easy to spread from person to person, most often through coughing or sneezing. A URI will almost always get better in a week or two without any treatment.         What care is needed at home?   Ask your doctor what you need to do when you go home. Make sure you ask questions if you do not understand what the doctor says.  If you smoke, try to quit. Your doctor or nurse can help.  Drink lots of fluids like water, juice, or broth. This will help replace any fluids lost if you have a runny nose or fever. Warm tea or soup can help soothe a sore throat.  If the air in your home feels dry, use a cool mist  humidifier. This can help a stuffy nose and make it easier to breathe.  You can also use saline nose drops to relieve stuffiness.  If you decide to take over-the-counter cough or cold medicines, follow the directions on the label carefully. Be sure you do not take more than 1 medicine that contains acetaminophen. Also, if you have a heart problem or high blood pressure, check with your doctor before you take any of these medicines.  Wash your hands often. Cough or sneeze into a tissue or your elbow instead of your hands. This will help keep others healthy.  What follow-up care is needed?   Your doctor may ask you to make visits to the office to check on your progress. Be sure to keep these visits.  What drugs may be needed?   The doctor may order drugs to:  Open up the tubes of your lungs  Treat viral infection  Relieve or stop coughing  Help with pain from a sore throat  Relieve runny and stuffy nose  Provide oxygen  Will physical activity be limited?   You need to rest for a few days to let your body recover from the infection.  What changes to diet are needed?   Eat soft foods like soup if swallowing is too painful.  What problems could happen?   Asthma attack  Sinus infections  Lung problems like pneumonia and bronchitis  Severe fluid loss. This is dehydration.  What can be done to prevent this health problem?   Wash your hands often with soap and water for at least 20 seconds, especially after coughing or sneezing. Alcohol-based hand sanitizers also work to kill the virus.  If you are sick, cover your mouth and nose with tissue when you cough or sneeze. You can also cough into your elbow. Throw away tissues in the trash and wash your hands after touching used tissues.  Do not get too close (kissing, hugging) to people who are sick.  Do not share towels or hankies with anyone who is sick. Clean commonly handled things like door handles, remotes, toys, and phones. Wipe them with a disinfectant.  Stay away from  crowded places.  Cover your nose and mouth when you sneeze or cough.  Take vitamin C to help build up your body's ability to fight disease.  Get a flu shot each year.  When do I need to call the doctor?   You have trouble breathing when talking or sitting still.  You have a fever of 100.4°F (38°C) or higher for several days, chills, a very bad sore throat, or ear or sinus pain.  You develop a new fever after several days of feeling the same or improving.  You develop chest pain when you cough.  You have a cough that lasts more than 10 days.  You cough up blood, or the color of the mucus you cough up changes.  Teach Back: Helping You Understand   The Teach Back Method helps you understand the information we are giving you. After you talk with the staff, tell them in your own words what you learned. This helps to make sure the staff has described each thing clearly. It also helps to explain things that may have been confusing. Before going home, make sure you can do these:  I can tell you about my condition.  I can tell you what may help ease my signs.  I can tell you what I will do if I have a fever, chills, breathing very fast, or trouble breathing.  Where can I learn more?   American Lung Association  https://www.lung.org/blog/can-you-exercise-with-a-cold   American Lung Association  https://www.lung.org/lung-health-diseases/lung-disease-lookup/influenza/facts-about-the-common-cold   NHS Choices  https://www.nhs.uk/conditions/respiratory-tract-infection/   UpToDate  https://www.uptodate.com/contents/the-common-cold-in-adults-beyond-the-basics   Last Reviewed Date   2021-06-08  Consumer Information Use and Disclaimer   This information is not specific medical advice and does not replace information you receive from your health care provider. This is only a brief summary of general information. It does NOT include all information about conditions, illnesses, injuries, tests, procedures, treatments, therapies,  discharge instructions or life-style choices that may apply to you. You must talk with your health care provider for complete information about your health and treatment options. This information should not be used to decide whether or not to accept your health care providers advice, instructions or recommendations. Only your health care provider has the knowledge and training to provide advice that is right for you.  Copyright   Copyright © 2021 [employer] and its affiliates and/or licensors. All rights reserved.

## 2023-11-27 NOTE — PATIENT INSTRUCTIONS
OVER THE COUNTER RECOMMENDATIONS/SUGGESTIONS (IF NO CONTRAINDICATIONS).     ·         Make sure to stay well hydrated.     ·         Use Nasal Saline to mechanically move any post nasal drip from your eustachian tube or from the back of your throat.     ·         Use warm saltwater gargles to ease your throat pain. Warm saltwater gargles as needed for sore throat-  1/2 tsp salt to 1 cup warm water, gargle as desired. Warm fluids tend to relieve a sore throat.     .         Throat lozenges, Chloraseptic spray or other over the counter treatments are ok to use as well. Use as directed.     ·         Use an antihistamine such as Claritin, Zyrtec or Allegra to dry you out.     ·         Use pseudoephedrine (behind the counter) to decongest. Pseudoephedrine  30 mg up to 240 mg /day. It can raise your blood pressure and give you palpitations.     ·         Use Mucinex (guaifenesin) to break up mucous up to 2400mg/day to loosen any mucous.     ·         The Mucinex DM pill has a cough suppressant that can be sedating. It can be used at night to stop the tickle at the back of your throat.     ·         You can use Mucinex D (it has guaifenesin and a high dose of pseudoephedrine) in the mornings to help decongest.     ·         Use Afrin (oxymetazoline) in each nare for no longer than 3 days, as it is addictive. It can also dry out your mucous membranes and cause elevated blood pressure. This is especially useful if you are flying.     ·         Use Flonase 1-2 sprays/nostril per day. It is a local acting steroid nasal spray, if you develop a bloody nose, stop using the medication immediately.     ·         Sometimes Nyquil at night is beneficial to help you get some rest, however it is sedating, and it does have an antihistamine, and Tylenol.     ·         Honey is a natural cough suppressant that can be used.     ·         Tylenol up to 4,000 mg a day is safe for short periods and can be used for body aches, pain, and  fever. However, in high doses and prolonged use it can cause liver irritation.     ·         Ibuprofen is a non-steroidal anti-inflammatory that can be used for body aches, pain, and fever. However, it can also cause stomach irritation if overused.     Recommendations for stomach:   . Try to stay hydrated (Water, Diluted fruit juices, Gatorade, Pedialyte, Popsicles) as best as you can.      . Stick to a bland diet. Avoid spicy, greasy, fatty foods or dairy products until symptoms improve then advance diet as tolerated.     . Pepto-Bismol, Imodium, or Metamucil over the counter as directed for diarrhea relief.     . Over the counter Probiotics, as directed, may be beneficial.      Viral Upper Respiratory Infection Discharge Instructions, Adult   About this topic   You have an upper respiratory infection or URI. A URI can affect your nose, throat, ears, and sinuses. A virus is the cause of almost all URIs and antibiotics will not help you feel better more quickly. The common cold is an example of a viral URI.  URIs are easy to spread from person to person, most often through coughing or sneezing. A URI will almost always get better in a week or two without any treatment.         What care is needed at home?   Ask your doctor what you need to do when you go home. Make sure you ask questions if you do not understand what the doctor says.  If you smoke, try to quit. Your doctor or nurse can help.  Drink lots of fluids like water, juice, or broth. This will help replace any fluids lost if you have a runny nose or fever. Warm tea or soup can help soothe a sore throat.  If the air in your home feels dry, use a cool mist humidifier. This can help a stuffy nose and make it easier to breathe.  You can also use saline nose drops to relieve stuffiness.  If you decide to take over-the-counter cough or cold medicines, follow the directions on the label carefully. Be sure you do not take more than 1 medicine that contains  acetaminophen. Also, if you have a heart problem or high blood pressure, check with your doctor before you take any of these medicines.  Wash your hands often. Cough or sneeze into a tissue or your elbow instead of your hands. This will help keep others healthy.  What follow-up care is needed?   Your doctor may ask you to make visits to the office to check on your progress. Be sure to keep these visits.  What drugs may be needed?   The doctor may order drugs to:  Open up the tubes of your lungs  Treat viral infection  Relieve or stop coughing  Help with pain from a sore throat  Relieve runny and stuffy nose  Provide oxygen  Will physical activity be limited?   You need to rest for a few days to let your body recover from the infection.  What changes to diet are needed?   Eat soft foods like soup if swallowing is too painful.  What problems could happen?   Asthma attack  Sinus infections  Lung problems like pneumonia and bronchitis  Severe fluid loss. This is dehydration.  What can be done to prevent this health problem?   Wash your hands often with soap and water for at least 20 seconds, especially after coughing or sneezing. Alcohol-based hand sanitizers also work to kill the virus.  If you are sick, cover your mouth and nose with tissue when you cough or sneeze. You can also cough into your elbow. Throw away tissues in the trash and wash your hands after touching used tissues.  Do not get too close (kissing, hugging) to people who are sick.  Do not share towels or hankies with anyone who is sick. Clean commonly handled things like door handles, remotes, toys, and phones. Wipe them with a disinfectant.  Stay away from crowded places.  Cover your nose and mouth when you sneeze or cough.  Take vitamin C to help build up your body's ability to fight disease.  Get a flu shot each year.  When do I need to call the doctor?   You have trouble breathing when talking or sitting still.  You have a fever of 100.4°F (38°C) or  higher for several days, chills, a very bad sore throat, or ear or sinus pain.  You develop a new fever after several days of feeling the same or improving.  You develop chest pain when you cough.  You have a cough that lasts more than 10 days.  You cough up blood, or the color of the mucus you cough up changes.  Teach Back: Helping You Understand   The Teach Back Method helps you understand the information we are giving you. After you talk with the staff, tell them in your own words what you learned. This helps to make sure the staff has described each thing clearly. It also helps to explain things that may have been confusing. Before going home, make sure you can do these:  I can tell you about my condition.  I can tell you what may help ease my signs.  I can tell you what I will do if I have a fever, chills, breathing very fast, or trouble breathing.  Where can I learn more?   American Lung Association  https://www.lung.org/blog/can-you-exercise-with-a-cold   American Lung Association  https://www.lung.org/lung-health-diseases/lung-disease-lookup/influenza/facts-about-the-common-cold   NHS Choices  https://www.nhs.uk/conditions/respiratory-tract-infection/   UpToDate  https://www.[employer].com/contents/the-common-cold-in-adults-beyond-the-basics   Last Reviewed Date   2021-06-08  Consumer Information Use and Disclaimer   This information is not specific medical advice and does not replace information you receive from your health care provider. This is only a brief summary of general information. It does NOT include all information about conditions, illnesses, injuries, tests, procedures, treatments, therapies, discharge instructions or life-style choices that may apply to you. You must talk with your health care provider for complete information about your health and treatment options. This information should not be used to decide whether or not to accept your health care providers advice, instructions or  recommendations. Only your health care provider has the knowledge and training to provide advice that is right for you.  Copyright   Copyright © 2021 [employer] and its affiliates and/or licensors. All rights reserved.

## 2024-02-07 ENCOUNTER — OFFICE VISIT (OUTPATIENT)
Dept: FAMILY MEDICINE | Facility: CLINIC | Age: 54
End: 2024-02-07
Payer: COMMERCIAL

## 2024-02-07 VITALS
SYSTOLIC BLOOD PRESSURE: 120 MMHG | DIASTOLIC BLOOD PRESSURE: 76 MMHG | OXYGEN SATURATION: 99 % | HEIGHT: 62 IN | WEIGHT: 204.06 LBS | BODY MASS INDEX: 37.55 KG/M2 | HEART RATE: 67 BPM | TEMPERATURE: 98 F

## 2024-02-07 DIAGNOSIS — R73.9 HYPERGLYCEMIA: ICD-10-CM

## 2024-02-07 DIAGNOSIS — R01.1 SYSTOLIC MURMUR: ICD-10-CM

## 2024-02-07 DIAGNOSIS — I50.9 CONGESTIVE HEART FAILURE, UNSPECIFIED HF CHRONICITY, UNSPECIFIED HEART FAILURE TYPE: ICD-10-CM

## 2024-02-07 DIAGNOSIS — Z12.31 SCREENING MAMMOGRAM FOR HIGH-RISK PATIENT: ICD-10-CM

## 2024-02-07 DIAGNOSIS — E66.01 SEVERE OBESITY (BMI 35.0-39.9) WITH COMORBIDITY: ICD-10-CM

## 2024-02-07 DIAGNOSIS — I42.2 HYPERTROPHIC CARDIOMYOPATHY: ICD-10-CM

## 2024-02-07 DIAGNOSIS — Z00.00 WELLNESS EXAMINATION: Primary | ICD-10-CM

## 2024-02-07 DIAGNOSIS — E53.8 B12 DEFICIENCY: ICD-10-CM

## 2024-02-07 DIAGNOSIS — Z23 NEED FOR SHINGLES VACCINE: ICD-10-CM

## 2024-02-07 DIAGNOSIS — Z13.6 SCREENING FOR CARDIOVASCULAR CONDITION: ICD-10-CM

## 2024-02-07 DIAGNOSIS — I10 PRIMARY HYPERTENSION: ICD-10-CM

## 2024-02-07 PROCEDURE — 1160F RVW MEDS BY RX/DR IN RCRD: CPT | Mod: CPTII,S$GLB,, | Performed by: STUDENT IN AN ORGANIZED HEALTH CARE EDUCATION/TRAINING PROGRAM

## 2024-02-07 PROCEDURE — 99396 PREV VISIT EST AGE 40-64: CPT | Mod: 25,S$GLB,, | Performed by: STUDENT IN AN ORGANIZED HEALTH CARE EDUCATION/TRAINING PROGRAM

## 2024-02-07 PROCEDURE — 3044F HG A1C LEVEL LT 7.0%: CPT | Mod: CPTII,S$GLB,, | Performed by: STUDENT IN AN ORGANIZED HEALTH CARE EDUCATION/TRAINING PROGRAM

## 2024-02-07 PROCEDURE — 3074F SYST BP LT 130 MM HG: CPT | Mod: CPTII,S$GLB,, | Performed by: STUDENT IN AN ORGANIZED HEALTH CARE EDUCATION/TRAINING PROGRAM

## 2024-02-07 PROCEDURE — 3008F BODY MASS INDEX DOCD: CPT | Mod: CPTII,S$GLB,, | Performed by: STUDENT IN AN ORGANIZED HEALTH CARE EDUCATION/TRAINING PROGRAM

## 2024-02-07 PROCEDURE — 1159F MED LIST DOCD IN RCRD: CPT | Mod: CPTII,S$GLB,, | Performed by: STUDENT IN AN ORGANIZED HEALTH CARE EDUCATION/TRAINING PROGRAM

## 2024-02-07 PROCEDURE — 90471 IMMUNIZATION ADMIN: CPT | Mod: S$GLB,,, | Performed by: STUDENT IN AN ORGANIZED HEALTH CARE EDUCATION/TRAINING PROGRAM

## 2024-02-07 PROCEDURE — 90750 HZV VACC RECOMBINANT IM: CPT | Mod: S$GLB,,, | Performed by: STUDENT IN AN ORGANIZED HEALTH CARE EDUCATION/TRAINING PROGRAM

## 2024-02-07 PROCEDURE — 3078F DIAST BP <80 MM HG: CPT | Mod: CPTII,S$GLB,, | Performed by: STUDENT IN AN ORGANIZED HEALTH CARE EDUCATION/TRAINING PROGRAM

## 2024-02-13 PROBLEM — I50.9 CONGESTIVE HEART FAILURE, UNSPECIFIED HF CHRONICITY, UNSPECIFIED HEART FAILURE TYPE: Status: ACTIVE | Noted: 2024-02-13

## 2024-02-13 PROBLEM — E66.01 SEVERE OBESITY (BMI 35.0-39.9) WITH COMORBIDITY: Status: ACTIVE | Noted: 2024-02-13

## 2024-02-13 NOTE — PROGRESS NOTES
Patient ID: Antonieta Enriquez is a 54 y.o. female.     Chief Complaint: Annual Exam    HPI   Patient here for annual wellness exam. She has no complaints today. She denies recent chest pain and shortness of breath. She denies fevers and chills. She reports normal bowel movements.     Review of Systems  Review of Systems   Constitutional:  Negative for fever.   HENT:  Negative for ear pain and sinus pain.    Eyes:  Negative for discharge.   Respiratory:  Negative for cough and shortness of breath.    Cardiovascular:  Negative for chest pain and leg swelling.   Gastrointestinal:  Negative for diarrhea, nausea and vomiting.   Genitourinary:  Negative for urgency.   Musculoskeletal:  Negative for myalgias.   Skin:  Negative for rash.   Neurological:  Negative for weakness and headaches.   Psychiatric/Behavioral:  Negative for depression.    All other systems reviewed and are negative.      Currently Medications  Current Outpatient Medications on File Prior to Visit   Medication Sig Dispense Refill    butalbital-acetaminophen-caffeine -40 mg (FIORICET, ESGIC) -40 mg per tablet Take 1 tablet by mouth every 6 (six) hours as needed for Headaches. 45 tablet 1    cyanocobalamin (VITAMIN B-12) 100 MCG tablet Take 1 tablet (100 mcg total) by mouth once daily. 90 tablet 3    LIDOcaine (LIDODERM) 5 % Place 1 patch onto the skin once daily. Remove & Discard patch within 12 hours or as directed by MD 15 patch 0    lisinopriL 10 MG tablet Take 1 tablet (10 mg total) by mouth once daily. 90 tablet 3    metoprolol succinate (TOPROL-XL) 50 MG 24 hr tablet TAKE 1 TABLET BY MOUTH EVERY DAY 90 tablet 1    ondansetron (ZOFRAN) 4 MG tablet Take 1 tablet (4 mg total) by mouth every 6 (six) hours as needed for Nausea. 30 tablet 0     No current facility-administered medications on file prior to visit.       Physical  Exam  Vitals:    02/07/24 1335   BP: 120/76   BP Location: Left arm   Patient Position: Sitting   Pulse: 67  "  Temp: 98.1 °F (36.7 °C)   TempSrc: Oral   SpO2: 99%   Weight: 92.5 kg (204 lb 0.6 oz)   Height: 5' 2" (1.575 m)      Body mass index is 37.32 kg/m².  Wt Readings from Last 3 Encounters:   02/07/24 92.5 kg (204 lb 0.6 oz)   07/28/23 89.8 kg (198 lb)   09/29/22 95.3 kg (210 lb)       Physical Exam  Vitals and nursing note reviewed.   Constitutional:       General: She is not in acute distress.     Appearance: She is not ill-appearing.   HENT:      Head: Normocephalic and atraumatic.      Right Ear: External ear normal.      Left Ear: External ear normal.      Nose: Nose normal.      Mouth/Throat:      Mouth: Mucous membranes are moist.   Eyes:      Extraocular Movements: Extraocular movements intact.      Conjunctiva/sclera: Conjunctivae normal.   Cardiovascular:      Rate and Rhythm: Normal rate and regular rhythm.      Pulses: Normal pulses.      Heart sounds: Murmur heard.   Pulmonary:      Effort: Pulmonary effort is normal. No respiratory distress.      Breath sounds: No wheezing.   Abdominal:      General: There is no distension.      Palpations: Abdomen is soft. There is no mass.      Tenderness: There is no abdominal tenderness.   Musculoskeletal:         General: No swelling.      Cervical back: Normal range of motion.   Skin:     Coloration: Skin is not jaundiced.      Findings: No rash.   Neurological:      General: No focal deficit present.      Mental Status: She is alert and oriented to person, place, and time.   Psychiatric:         Mood and Affect: Mood normal.         Thought Content: Thought content normal.         Labs:    Complete Blood Count  Lab Results   Component Value Date    RBC 4.69 02/12/2024    HGB 11.4 (L) 02/12/2024    HCT 37.1 02/12/2024    MCV 79 (L) 02/12/2024    MCH 24.3 (L) 02/12/2024    MCHC 30.7 (L) 02/12/2024    RDW 14.9 (H) 02/12/2024     02/12/2024    MPV 9.5 02/12/2024    GRAN 2.1 02/12/2024    GRAN 51.6 02/12/2024    LYMPH 1.7 02/12/2024    LYMPH 42.3 02/12/2024    " MONO 0.2 (L) 02/12/2024    MONO 5.4 02/12/2024    EOS 0.0 02/12/2024    BASO 0.01 02/12/2024    EOSINOPHIL 0.5 02/12/2024    BASOPHIL 0.2 02/12/2024    DIFFMETHOD Automated 02/12/2024       Comprehensive Metabolic Panel  Lab Results   Component Value Date    GLU 86 02/12/2024    BUN 12 02/12/2024    CREATININE 0.98 02/12/2024     02/12/2024    K 4.7 02/12/2024     (H) 02/12/2024    PROT 7.3 02/12/2024    ALBUMIN 3.8 02/12/2024    BILITOT 0.4 02/12/2024    AST 30 02/12/2024    ALKPHOS 110 02/12/2024    CO2 29 02/12/2024    ALT 18 02/12/2024    ANIONGAP 2 (L) 02/12/2024       TSH  Lab Results   Component Value Date    TSH 1.500 02/12/2024       Imaging:  CT Head Without Contrast  Narrative: EXAMINATION:  CT HEAD WITHOUT CONTRAST    CLINICAL HISTORY:  Headache, new or worsening (Age >= 50y);    TECHNIQUE:  Low dose axial images were obtained through the head.  Coronal and sagittal reformations were also performed. Contrast was not administered.    COMPARISON:  February 2016    FINDINGS:  No acute intracranial hemorrhage or mass effect.  Ventricle stable caliber.  No displaced skull fracture.  Visualized sinuses and mastoids well aerated  Impression: No acute or adverse finding    Electronically signed by: Laura Kern  Date:    07/28/2023  Time:    18:23      Assessment/Plan:    1. Wellness examination    2. Primary hypertension  -     CBC Auto Differential; Future  -     Comprehensive metabolic panel; Future; Expected date: 02/07/2024  -     TSH; Future; Expected date: 02/07/2024    3. Hypertrophic cardiomyopathy  Assessment & Plan:  - chronic  - follows with cardiology      4. Systolic murmur  -     Echo; Future    5. Congestive heart failure, unspecified HF chronicity, unspecified heart failure type  Assessment & Plan:  - euvolemic on exam  - continue current meds        6. Screening mammogram for high-risk patient  -     Mammo Digital Screening Bilat w/ Vic; Future; Expected date:  02/07/2024    7. B12 deficiency  -     Vitamin B12; Future    8. Hyperglycemia  -     HEMOGLOBIN A1C; Future; Expected date: 02/07/2024    9. Screening for cardiovascular condition  -     LIPID PANEL; Future; Expected date: 02/07/2024    10. Severe obesity (BMI 35.0-39.9) with comorbidity  Assessment & Plan:  Body mass index is 37.32 kg/m².  - discussed diet and exercise      11. Need for shingles vaccine  -     (In Office Administered) Zoster Recombinant Vaccine         Discussed how to stay healthy including: diet, exercise, refraining from smoking and discussed screening exams / tests needed for age, sex and family Hx.    RTC 6 mo    Rebel Kimball MD

## 2024-02-16 ENCOUNTER — TELEPHONE (OUTPATIENT)
Dept: FAMILY MEDICINE | Facility: CLINIC | Age: 54
End: 2024-02-16
Payer: COMMERCIAL

## 2024-03-07 ENCOUNTER — HOSPITAL ENCOUNTER (OUTPATIENT)
Dept: RADIOLOGY | Facility: HOSPITAL | Age: 54
Discharge: HOME | End: 2024-03-07
Attending: STUDENT IN AN ORGANIZED HEALTH CARE EDUCATION/TRAINING PROGRAM
Payer: COMMERCIAL

## 2024-03-07 DIAGNOSIS — Z12.31 SCREENING MAMMOGRAM FOR HIGH-RISK PATIENT: ICD-10-CM

## 2024-03-07 PROCEDURE — 77067 SCR MAMMO BI INCL CAD: CPT | Mod: 26,,, | Performed by: RADIOLOGY

## 2024-03-07 PROCEDURE — 77067 SCR MAMMO BI INCL CAD: CPT | Mod: TC,PN

## 2024-03-07 PROCEDURE — 77063 BREAST TOMOSYNTHESIS BI: CPT | Mod: 26,,, | Performed by: RADIOLOGY

## 2024-05-21 ENCOUNTER — ON-DEMAND VIRTUAL (OUTPATIENT)
Dept: URGENT CARE | Facility: CLINIC | Age: 54
End: 2024-05-21
Payer: COMMERCIAL

## 2024-05-21 DIAGNOSIS — J06.9 UPPER RESPIRATORY TRACT INFECTION, UNSPECIFIED TYPE: Primary | ICD-10-CM

## 2024-05-21 PROCEDURE — 99213 OFFICE O/P EST LOW 20 MIN: CPT | Mod: 95,,, | Performed by: NURSE PRACTITIONER

## 2024-05-21 RX ORDER — BENZONATATE 100 MG/1
100 CAPSULE ORAL 3 TIMES DAILY PRN
Qty: 60 CAPSULE | Refills: 0 | Status: SHIPPED | OUTPATIENT
Start: 2024-05-21 | End: 2024-06-10

## 2024-05-21 NOTE — PROGRESS NOTES
Subjective:      Patient ID: Antonieta Enriquez is a 54 y.o. female.    Vitals:  vitals were not taken for this visit.     Chief Complaint: URI      Visit Type: TELE AUDIOVISUAL    Present with the patient at the time of consultation: TELEMED PRESENT WITH PATIENT: None, at home    Past Medical History:   Diagnosis Date    Hypertension     Hypertrophic cardiomyopathy     Migraine      Past Surgical History:   Procedure Laterality Date    BREAST BIOPSY Right      neg     SECTION      CHOLECYSTECTOMY      GASTRIC BYPASS N/A 2009    HYSTERECTOMY       Review of patient's allergies indicates:  No Known Allergies  Current Outpatient Medications on File Prior to Visit   Medication Sig Dispense Refill    butalbital-acetaminophen-caffeine -40 mg (FIORICET, ESGIC) -40 mg per tablet Take 1 tablet by mouth every 6 (six) hours as needed for Headaches. 45 tablet 1    cyanocobalamin (VITAMIN B-12) 100 MCG tablet Take 1 tablet (100 mcg total) by mouth once daily. 90 tablet 3    LIDOcaine (LIDODERM) 5 % Place 1 patch onto the skin once daily. Remove & Discard patch within 12 hours or as directed by MD 15 patch 0    lisinopriL 10 MG tablet Take 1 tablet (10 mg total) by mouth once daily. 90 tablet 3    metoprolol succinate (TOPROL-XL) 50 MG 24 hr tablet TAKE 1 TABLET BY MOUTH EVERY DAY 90 tablet 1    ondansetron (ZOFRAN) 4 MG tablet Take 1 tablet (4 mg total) by mouth every 6 (six) hours as needed for Nausea. 30 tablet 0     No current facility-administered medications on file prior to visit.     Family History   Problem Relation Name Age of Onset    Arthritis Mother      Diabetes Mother      Hypertension Mother      Cancer Father      Diabetes Brother      Diabetes Brother         Medications Ordered                [facility]/pharmacy #[id number] - [address] AT [address] OF [address] [address]    Telephone: [phone]   Fax: [phone]   Hours: Not open  24 hours                         E-Prescribed (1 of 1)              benzonatate (TESSALON) 100 MG capsule    Sig: Take 1 capsule (100 mg total) by mouth 3 (three) times daily as needed for Cough. May take 1-2 caps 3 times daily as needed.       Start: 5/21/24     Quantity: 60 capsule Refills: 0                           Ohs Peq Odvv Intake    5/21/2024  7:48 AM CDT - Filed by Patient   What is your current physical address in the event of a medical emergency? [address]   Are you able to take your vital signs? No   Please attach any relevant images or files          URI symptoms for 5 days. Started with sore throat and headache. Progressed to cough, congestion and post-nasal drip. No known sick contacts. Took Theraflu with little relief. COVID negative x 2.        Constitution: Negative for chills and fever.   HENT:  Positive for congestion, postnasal drip and sinus pressure. Negative for ear pain, sore throat, trouble swallowing and voice change.    Respiratory:  Positive for cough. Negative for sputum production, shortness of breath and wheezing.    Gastrointestinal:  Negative for nausea, vomiting and diarrhea.   Musculoskeletal:  Positive for muscle ache (mild).   Allergic/Immunologic: Positive for environmental allergies.   Neurological:  Positive for headaches.        Objective:   The physical exam was conducted virtually.  Physical Exam   Constitutional: She is oriented to person, place, and time. She does not appear ill. No distress.   HENT:   Head: Normocephalic and atraumatic.   Nose: Nose normal.   Eyes: Extraocular movement intact   Pulmonary/Chest: Effort normal.   Abdominal: Normal appearance.   Musculoskeletal: Normal range of motion.         General: Normal range of motion.   Neurological: no focal deficit. She is alert and oriented to person, place, and time.   Psychiatric: Her behavior is normal. Mood normal.   Vitals reviewed.      Assessment:     1. Upper respiratory tract infection,  unspecified type        Plan:   Patient encouraged to monitor symptoms closely and instructed to follow-up for new or worsening symptoms. Further, in-person, evaluation may be necessary for continued treatment. Please follow up with your primary care doctor or specialist as needed. Verbally discussed plan. Patient confirms understanding and is in agreement with treatment and plan.     You must understand that you've received a Virtual Care evaluation only and that you may be released before all your medical problems are known or treated. You, the patient, will arrange for follow up care as instructed.      Upper respiratory tract infection, unspecified type  -     benzonatate (TESSALON) 100 MG capsule; Take 1 capsule (100 mg total) by mouth 3 (three) times daily as needed for Cough. May take 1-2 caps 3 times daily as needed.  Dispense: 60 capsule; Refill: 0        Patient Instructions   OVER THE COUNTER RECOMMENDATIONS/SUGGESTIONS (IF NO CONTRAINDICATIONS).     ·         Make sure to stay well hydrated.     ·         Use Nasal Saline to mechanically move any post nasal drip from your eustachian tube or from the back of your throat.     ·         Use warm saltwater gargles to ease your throat pain. Warm saltwater gargles as needed for sore throat-  1/2 tsp salt to 1 cup warm water, gargle as desired. Warm fluids tend to relieve a sore throat.     .         Throat lozenges, Chloraseptic spray or other over the counter treatments are ok to use as well. Use as directed.     ·         Use an antihistamine such as Claritin, Zyrtec or Allegra to dry you out.     ·         Use pseudoephedrine (behind the counter) to decongest. Pseudoephedrine  30 mg up to 240 mg /day. It can raise your blood pressure and give you palpitations.     ·         Use Mucinex (guaifenesin) to break up mucous up to 2400mg/day to loosen any mucous.     ·         The Mucinex DM pill has a cough suppressant that can be sedating. It can be used at  night to stop the tickle at the back of your throat.     ·         You can use Mucinex D (it has guaifenesin and a high dose of pseudoephedrine) in the mornings to help decongest.     ·         Use Afrin (oxymetazoline) in each nare for no longer than 3 days, as it is addictive. It can also dry out your mucous membranes and cause elevated blood pressure. This is especially useful if you are flying.     ·         Use Flonase 1-2 sprays/nostril per day. It is a local acting steroid nasal spray, if you develop a bloody nose, stop using the medication immediately.     ·         Sometimes Nyquil at night is beneficial to help you get some rest, however it is sedating, and it does have an antihistamine, and Tylenol.     ·         Honey is a natural cough suppressant that can be used.     ·         Tylenol up to 4,000 mg a day is safe for short periods and can be used for body aches, pain, and fever. However, in high doses and prolonged use it can cause liver irritation.     ·         Ibuprofen is a non-steroidal anti-inflammatory that can be used for body aches, pain, and fever. However, it can also cause stomach irritation if overused.

## 2024-05-22 ENCOUNTER — HOSPITAL ENCOUNTER (EMERGENCY)
Facility: HOSPITAL | Age: 54
Discharge: HOME | End: 2024-05-22
Attending: EMERGENCY MEDICINE
Payer: COMMERCIAL

## 2024-05-22 VITALS
HEIGHT: 62 IN | SYSTOLIC BLOOD PRESSURE: 185 MMHG | OXYGEN SATURATION: 95 % | DIASTOLIC BLOOD PRESSURE: 98 MMHG | BODY MASS INDEX: 36.62 KG/M2 | TEMPERATURE: 99 F | RESPIRATION RATE: 18 BRPM | HEART RATE: 86 BPM | WEIGHT: 199 LBS

## 2024-05-22 DIAGNOSIS — J06.9 VIRAL URI: Primary | ICD-10-CM

## 2024-05-22 LAB
INFLUENZA A, MOLECULAR: NEGATIVE
INFLUENZA B, MOLECULAR: NEGATIVE
SARS-COV-2 RDRP RESP QL NAA+PROBE: NEGATIVE
SPECIMEN SOURCE: NORMAL

## 2024-05-22 PROCEDURE — 99283 EMERGENCY DEPT VISIT LOW MDM: CPT | Mod: ER

## 2024-05-22 PROCEDURE — 87502 INFLUENZA DNA AMP PROBE: CPT | Mod: ER

## 2024-05-22 PROCEDURE — U0002 COVID-19 LAB TEST NON-CDC: HCPCS | Mod: ER

## 2024-05-22 RX ORDER — CETIRIZINE HYDROCHLORIDE 10 MG/1
10 TABLET ORAL DAILY
Qty: 30 TABLET | Refills: 0 | Status: SHIPPED | OUTPATIENT
Start: 2024-05-22 | End: 2024-06-21

## 2024-05-22 RX ORDER — FLUTICASONE PROPIONATE 50 MCG
2 SPRAY, SUSPENSION (ML) NASAL 2 TIMES DAILY PRN
Qty: 15 G | Refills: 0 | Status: SHIPPED | OUTPATIENT
Start: 2024-05-22

## 2024-05-22 RX ORDER — IBUPROFEN 800 MG/1
800 TABLET ORAL EVERY 6 HOURS PRN
Qty: 50 TABLET | Refills: 0 | Status: SHIPPED | OUTPATIENT
Start: 2024-05-22 | End: 2024-06-04

## 2024-05-22 RX ORDER — ACETAMINOPHEN 500 MG
1000 TABLET ORAL 4 TIMES DAILY
Qty: 112 TABLET | Refills: 0 | Status: SHIPPED | OUTPATIENT
Start: 2024-05-22 | End: 2024-06-05

## 2024-05-22 NOTE — Clinical Note
"Antonieta"Nickie Enriquez was seen and treated in our emergency department on 5/22/2024.  She may return to work on 05/24/2024.       If you have any questions or concerns, please don't hesitate to call.      Huyen Turner PA-C"

## 2024-05-22 NOTE — DISCHARGE INSTRUCTIONS
You likely have a viral upper respiratory infection. There are plenty of virus besides COVID and influenza that can cause your symptoms.   - Rest.    - Drink plenty of fluids.  - Avoid crowds and wash your hands.   - Viral upper respiratory infections typically run their course in 10-14 days.   - Tylenol (acetaminophen) or Ibuprofen as directed as needed for fever/pain. You may take 1000 mg of tylenol three times a day. Avoid tylenol if you have a history of liver disease. You may take 800 mg of ibuprofen three times a day. Do not take ibuprofen if you have a history of GI bleeding, kidney disease, gastric surgery, if you take blood thinners, or if you are pregnant.   - You can take the cetirizine/zyrtec as directed. These are antihistamines that can help with runny nose, nasal congestion, sneezing, and helps to dry up post-nasal drip, which usually causes sore throat and cough.  - You can use Flonase (fluticasone) nasal spray as directed for sinus congestion and postnasal drip. This is a steroid nasal spray that works locally over time to decrease the inflammation in your nose/sinuses and help with allergic symptoms. This is not an quick- relief spray like afrin, but it works well if used daily.  Discontinue if you develop a nose bleed.  - use nasal saline prior to Flonase.  - Use Ocean Spray Nasal Saline 1-3 puffs each nostril every 2-3 hours then blow out onto tissue. This is to irrigate the nasal passage way to clear the sinus openings. Use until sinus problem resolved.  - Warm salt water gargles, cough drops, and chloraseptic spray can help with sore throat.  - Warm tea with honey can help with cough and sore throat. Honey is a natural cough suppressant.  - Dextromethorphan (DM) is a cough suppressant over the counter (ie. mucinex DM, robitussin, delsym; dayquil/nyquil has DM as well.  - Please AVOID over the counter medications URI medications that have Oxymetazoline, Phenylephrine, or Pseudoephedrine if you  have high blood pressure.  Most over the counter medications will write on the box if they are safe vs should be avoided in patients with HTN, or you can always bring the box to the pharmacist and ask if you have any questions.    - Please make an appointment with your primary care provider in the next 3 days if symptoms not improved.

## 2024-07-10 DIAGNOSIS — I42.2 HYPERTROPHIC CARDIOMYOPATHY: ICD-10-CM

## 2024-07-10 DIAGNOSIS — I10 PRIMARY HYPERTENSION: ICD-10-CM

## 2024-07-10 RX ORDER — LISINOPRIL 10 MG/1
10 TABLET ORAL DAILY
Qty: 90 TABLET | Refills: 3 | Status: SHIPPED | OUTPATIENT
Start: 2024-07-10

## 2024-07-10 RX ORDER — METOPROLOL SUCCINATE 50 MG/1
50 TABLET, EXTENDED RELEASE ORAL DAILY
Qty: 90 TABLET | Refills: 1 | Status: SHIPPED | OUTPATIENT
Start: 2024-07-10

## 2024-07-10 RX ORDER — PNV NO.95/FERROUS FUM/FOLIC AC 28MG-0.8MG
100 TABLET ORAL DAILY
Qty: 90 TABLET | Refills: 3 | Status: SHIPPED | OUTPATIENT
Start: 2024-07-10

## 2024-07-10 NOTE — TELEPHONE ENCOUNTER
No care due was identified.  Health [facility] Embedded Care Due Messages. Reference number: [id number].   7/10/2024 8:50:29 AM CDT

## 2024-07-10 NOTE — TELEPHONE ENCOUNTER
No care due was identified.  Health system Embedded Care Due Messages. Reference number: [id number].   7/10/2024 8:52:18 AM CDT

## 2025-02-26 DIAGNOSIS — I10 HYPERTENSION: ICD-10-CM
